# Patient Record
Sex: MALE | Race: WHITE
[De-identification: names, ages, dates, MRNs, and addresses within clinical notes are randomized per-mention and may not be internally consistent; named-entity substitution may affect disease eponyms.]

---

## 2018-12-08 ENCOUNTER — HOSPITAL ENCOUNTER (OUTPATIENT)
Dept: HOSPITAL 47 - LABWHC1 | Age: 63
End: 2018-12-08
Payer: COMMERCIAL

## 2018-12-08 DIAGNOSIS — E78.2: Primary | ICD-10-CM

## 2018-12-08 LAB
ALT SERPL-CCNC: 28 U/L (ref 10–49)
AST SERPL-CCNC: 24 U/L (ref 14–35)
CHOLEST SERPL-MCNC: 108 MG/DL (ref 0–200)
HDLC SERPL-MCNC: 41 MG/DL (ref 40–60)
LDLC SERPL CALC-MCNC: 50.6 MG/DL (ref 0–131)
TRIGL SERPL-MCNC: 82 MG/DL (ref 0–149)
VLDLC SERPL CALC-MCNC: 16.4 MG/DL (ref 5–40)

## 2018-12-08 PROCEDURE — 36415 COLL VENOUS BLD VENIPUNCTURE: CPT

## 2018-12-08 PROCEDURE — 80061 LIPID PANEL: CPT

## 2018-12-08 PROCEDURE — 84460 ALANINE AMINO (ALT) (SGPT): CPT

## 2018-12-08 PROCEDURE — 84450 TRANSFERASE (AST) (SGOT): CPT

## 2019-03-16 ENCOUNTER — HOSPITAL ENCOUNTER (EMERGENCY)
Dept: HOSPITAL 47 - EC | Age: 64
Discharge: HOME | End: 2019-03-16
Payer: COMMERCIAL

## 2019-03-16 VITALS
SYSTOLIC BLOOD PRESSURE: 146 MMHG | TEMPERATURE: 97.6 F | DIASTOLIC BLOOD PRESSURE: 65 MMHG | RESPIRATION RATE: 18 BRPM | HEART RATE: 82 BPM

## 2019-03-16 DIAGNOSIS — Z96.89: ICD-10-CM
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
## 2019-03-16 DIAGNOSIS — S63.502A: Primary | ICD-10-CM

## 2019-03-16 DIAGNOSIS — Z79.82: ICD-10-CM
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
## 2019-03-16 DIAGNOSIS — Z79.899: ICD-10-CM

## 2019-03-16 DIAGNOSIS — I10: ICD-10-CM

## 2019-03-16 DIAGNOSIS — I25.10: ICD-10-CM

## 2019-03-16 DIAGNOSIS — Z95.5: ICD-10-CM
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
## 2019-03-16 DIAGNOSIS — Z79.4: ICD-10-CM

## 2019-03-16 DIAGNOSIS — E11.9: ICD-10-CM

## 2019-03-16 DIAGNOSIS — Z79.02: ICD-10-CM

## 2019-03-16 DIAGNOSIS — I25.2: ICD-10-CM

## 2019-03-16 DIAGNOSIS — F17.200: ICD-10-CM

## 2019-03-16 DIAGNOSIS — E78.5: ICD-10-CM

## 2019-03-16 PROCEDURE — 99283 EMERGENCY DEPT VISIT LOW MDM: CPT

## 2019-03-16 NOTE — XR
EXAMINATION TYPE: XR wrist complete LT , 4 VIEWS

 

DATE OF EXAM ORDERED: 3/16/2019

 

HISTORY: Pain.

 

COMPARISON: None.

 

FINDINGS:  There are degenerative changes in the first carpometacarpal joint as well as the triscaphe
 joint. No fracture, dislocation or other acute osseous lesion is seen.

 

IMPRESSION: 

1. NO ACUTE OSSEOUS FRACTURE.

2. DEGENERATIVE CHANGE.

## 2019-03-16 NOTE — ED
Upper Extremity HPI





- General


Chief Complaint: Extremity Injury, Upper


Stated Complaint: Wrist injury


Time Seen by Provider: 03/16/19 11:02


Source: patient, RN notes reviewed


Mode of arrival: ambulatory


Limitations: no limitations





- History of Present Illness


Initial Comments: 





63-year-old male presents emergency Department chief complaint of left first 

injury.  Patient states that his symptoms while at work yesterday and fell down 

onto his left wrist.  Patient states is more swollen and painful today.  Patient

did not apply any heat or ice.  Patient denies any paresthesias.  Patient states

he does have known arthritic changes.





- Related Data


                                Home Medications











 Medication  Instructions  Recorded  Confirmed


 


Aspirin EC [Ecotrin] 81 mg PO DAILY 03/04/15 03/04/15


 


Clopidogrel [Plavix] 75 mg PO DAILY 03/04/15 03/04/15


 


Famotidine [Pepcid] 20 mg PO HS 03/04/15 03/04/15


 


Gabapentin [Neurontin] 300 mg PO HS 03/04/15 03/04/15


 


INSULIN ASPART (NovoLOG) [NovoLOG] 0 unit SQ ACHS 03/04/15 03/04/15


 


Insulin Glargine,Hum.rec.anlog 88 unit SQ DAILY 03/04/15 03/04/15





[Lantus Solostar]   


 


Lisinopril [Prinivil] 20 mg PO DAILY 03/04/15 03/04/15


 


Metoprolol Tartrate [Lopressor] 50 mg PO BID 03/04/15 03/04/15


 


Saxagliptin HCl/Metformin HCl 1 each PO DAILY 03/04/15 03/04/15





[Kombiglyze Xr 5-1,000 mg Tab]   


 


Simvastatin [Zocor] 40 mg PO HS 03/04/15 03/04/15








                                  Previous Rx's











 Medication  Instructions  Recorded


 


Cyclobenzaprine [Flexeril] 10 mg PO TID PRN #15 tab 03/04/15


 


HYDROcodone/APAP 10-325MG [Norco 1 each PO Q6H PRN #20 tab 03/04/15





10]  











                                    Allergies











Allergy/AdvReac Type Severity Reaction Status Date / Time


 


No Known Allergies Allergy   Verified 03/04/15 11:43














Review of Systems


ROS Statement: 


Those systems with pertinent positive or pertinent negative responses have been 

documented in the HPI.





ROS Other: All systems not noted in ROS Statement are negative.





Past Medical History


Past Medical History: Coronary Artery Disease (CAD), Diabetes Mellitus, 

Hyperlipidemia, Hypertension, Myocardial Infarction (MI)


History of Any Multi-Drug Resistant Organisms: None Reported


Past Surgical History: Heart Catheterization With Stent, Joint Replacement, 

Orthopedic Surgery, Tonsillectomy


Past Psychological History: No Psychological Hx Reported


Smoking Status: Current every day smoker


Past Alcohol Use History: Rare


Past Drug Use History: None Reported





General Exam


Limitations: no limitations


General appearance: alert, in no apparent distress


Head exam: Present: atraumatic, normocephalic, normal inspection


Respiratory exam: Present: normal lung sounds bilaterally.  Absent: respiratory 

distress, wheezes, rales, rhonchi, stridor


Cardiovascular Exam: Present: regular rate, normal rhythm, normal heart sounds. 

Absent: systolic murmur, diastolic murmur, rubs, gallop, clicks


Extremities exam: Present: other (Left wrist there is moderate swelling, mild 

tenderness mild tenderness in the proximal hand.  There is no distal phalanx 

tenderness, patient's full range of motion.  Neurovascular intact)


Skin exam: Present: warm, dry, intact, normal color.  Absent: rash





Course





                                   Vital Signs











  03/16/19





  10:58


 


Temperature 97.6 F


 


Pulse Rate 82


 


Respiratory 18





Rate 


 


Blood Pressure 146/65


 


O2 Sat by Pulse 96





Oximetry 














Medical Decision Making





- Medical Decision Making





63-year-old male presented for left wrist injury.  X-rays were obtained as read 

by radiologist no acute fracture.  Patient is a left wrist sprain.  Patient will

be started on anti-inflammatories, ice and return parameters were discussed.





- Radiology Data


Radiology results: report reviewed, image reviewed





X-ray no acute fracture.





Disposition


Clinical Impression: 


 Left wrist sprain





Disposition: HOME SELF-CARE


Condition: Stable


Instructions (If sedation given, give patient instructions):  Wrist Injury (ED)


Additional Instructions: 


Please return to the Emergency Department if symptoms worsen or any other 

concerns.  Follow-up with orthopedics if no improvement.


Is patient prescribed a controlled substance at d/c from ED?: No


Referrals: 


Umesh Singh MD [Primary Care Provider] - 1-2 days


Ricky Yo DO [Doctor of Osteopathic Medicine] - 1-2 days


Time of Disposition: 11:49

## 2019-12-31 ENCOUNTER — HOSPITAL ENCOUNTER (OUTPATIENT)
Dept: HOSPITAL 47 - LABWHC1 | Age: 64
Discharge: HOME | End: 2019-12-31
Attending: INTERNAL MEDICINE
Payer: COMMERCIAL

## 2019-12-31 DIAGNOSIS — E78.2: Primary | ICD-10-CM

## 2019-12-31 LAB
ALT SERPL-CCNC: 18 U/L (ref 10–49)
AST SERPL-CCNC: 16 U/L (ref 14–35)
CHOLEST SERPL-MCNC: 178 MG/DL (ref 0–200)
HDLC SERPL-MCNC: 38 MG/DL (ref 40–60)
LDLC SERPL CALC-MCNC: 111 MG/DL (ref 0–131)
TRIGL SERPL-MCNC: 145 MG/DL (ref 0–149)
VLDLC SERPL CALC-MCNC: 29 MG/DL (ref 5–40)

## 2019-12-31 PROCEDURE — 84460 ALANINE AMINO (ALT) (SGPT): CPT

## 2019-12-31 PROCEDURE — 84450 TRANSFERASE (AST) (SGOT): CPT

## 2019-12-31 PROCEDURE — 80061 LIPID PANEL: CPT

## 2019-12-31 PROCEDURE — 36415 COLL VENOUS BLD VENIPUNCTURE: CPT

## 2020-12-29 ENCOUNTER — HOSPITAL ENCOUNTER (OUTPATIENT)
Dept: HOSPITAL 47 - LABWHC1 | Age: 65
Discharge: HOME | End: 2020-12-29
Attending: INTERNAL MEDICINE
Payer: MEDICARE

## 2020-12-29 DIAGNOSIS — E78.2: Primary | ICD-10-CM

## 2020-12-29 LAB
ALT SERPL-CCNC: 18 U/L (ref 10–49)
AST SERPL-CCNC: 14 U/L (ref 14–35)
CHOLEST SERPL-MCNC: 181 MG/DL (ref 0–200)
HDLC SERPL-MCNC: 35 MG/DL (ref 40–60)
LDLC SERPL CALC-MCNC: 114.8 MG/DL (ref 0–131)
TRIGL SERPL-MCNC: 156 MG/DL (ref 0–149)
VLDLC SERPL CALC-MCNC: 31.2 MG/DL (ref 5–40)

## 2020-12-29 PROCEDURE — 84460 ALANINE AMINO (ALT) (SGPT): CPT

## 2020-12-29 PROCEDURE — 84450 TRANSFERASE (AST) (SGOT): CPT

## 2020-12-29 PROCEDURE — 36415 COLL VENOUS BLD VENIPUNCTURE: CPT

## 2020-12-29 PROCEDURE — 80061 LIPID PANEL: CPT

## 2021-10-04 ENCOUNTER — HOSPITAL ENCOUNTER (EMERGENCY)
Dept: HOSPITAL 47 - EC | Age: 66
Discharge: LEFT BEFORE DISCHARGE | End: 2021-10-04
Payer: MEDICARE

## 2021-10-04 VITALS — RESPIRATION RATE: 20 BRPM

## 2021-10-04 VITALS — HEART RATE: 70 BPM | TEMPERATURE: 98.2 F | SYSTOLIC BLOOD PRESSURE: 122 MMHG | DIASTOLIC BLOOD PRESSURE: 79 MMHG

## 2021-10-04 DIAGNOSIS — I25.2: ICD-10-CM

## 2021-10-04 DIAGNOSIS — E11.9: ICD-10-CM

## 2021-10-04 DIAGNOSIS — I10: ICD-10-CM

## 2021-10-04 DIAGNOSIS — L03.221: Primary | ICD-10-CM

## 2021-10-04 DIAGNOSIS — Z79.82: ICD-10-CM

## 2021-10-04 DIAGNOSIS — Z79.4: ICD-10-CM

## 2021-10-04 DIAGNOSIS — I25.10: ICD-10-CM

## 2021-10-04 DIAGNOSIS — Z86.16: ICD-10-CM

## 2021-10-04 DIAGNOSIS — Z95.5: ICD-10-CM

## 2021-10-04 DIAGNOSIS — F17.200: ICD-10-CM

## 2021-10-04 DIAGNOSIS — Z79.899: ICD-10-CM

## 2021-10-04 DIAGNOSIS — E78.5: ICD-10-CM

## 2021-10-04 LAB
ALBUMIN SERPL-MCNC: 3.9 G/DL (ref 3.5–5)
ALP SERPL-CCNC: 97 U/L (ref 38–126)
ALT SERPL-CCNC: 15 U/L (ref 4–49)
ANION GAP SERPL CALC-SCNC: 9 MMOL/L
AST SERPL-CCNC: 29 U/L (ref 17–59)
BASOPHILS # BLD AUTO: 0.1 K/UL (ref 0–0.2)
BASOPHILS NFR BLD AUTO: 1 %
BUN SERPL-SCNC: 21 MG/DL (ref 9–20)
CALCIUM SPEC-MCNC: 9.4 MG/DL (ref 8.4–10.2)
CHLORIDE SERPL-SCNC: 103 MMOL/L (ref 98–107)
CO2 SERPL-SCNC: 22 MMOL/L (ref 22–30)
EOSINOPHIL # BLD AUTO: 0.2 K/UL (ref 0–0.7)
EOSINOPHIL NFR BLD AUTO: 2 %
ERYTHROCYTE [DISTWIDTH] IN BLOOD BY AUTOMATED COUNT: 4.69 M/UL (ref 4.3–5.9)
ERYTHROCYTE [DISTWIDTH] IN BLOOD: 14.5 % (ref 11.5–15.5)
GLUCOSE SERPL-MCNC: 235 MG/DL (ref 74–99)
HCT VFR BLD AUTO: 42.7 % (ref 39–53)
HGB BLD-MCNC: 14.5 GM/DL (ref 13–17.5)
LYMPHOCYTES # SPEC AUTO: 1.4 K/UL (ref 1–4.8)
LYMPHOCYTES NFR SPEC AUTO: 15 %
MCH RBC QN AUTO: 31 PG (ref 25–35)
MCHC RBC AUTO-ENTMCNC: 34 G/DL (ref 31–37)
MCV RBC AUTO: 91.1 FL (ref 80–100)
MONOCYTES # BLD AUTO: 0.5 K/UL (ref 0–1)
MONOCYTES NFR BLD AUTO: 5 %
NEUTROPHILS # BLD AUTO: 7.4 K/UL (ref 1.3–7.7)
NEUTROPHILS NFR BLD AUTO: 75 %
PLATELET # BLD AUTO: 326 K/UL (ref 150–450)
POTASSIUM SERPL-SCNC: 5.2 MMOL/L (ref 3.5–5.1)
PROT SERPL-MCNC: 7.2 G/DL (ref 6.3–8.2)
SODIUM SERPL-SCNC: 134 MMOL/L (ref 137–145)
WBC # BLD AUTO: 9.8 K/UL (ref 3.8–10.6)

## 2021-10-04 PROCEDURE — 96361 HYDRATE IV INFUSION ADD-ON: CPT

## 2021-10-04 PROCEDURE — 80053 COMPREHEN METABOLIC PANEL: CPT

## 2021-10-04 PROCEDURE — 83605 ASSAY OF LACTIC ACID: CPT

## 2021-10-04 PROCEDURE — 85025 COMPLETE CBC W/AUTO DIFF WBC: CPT

## 2021-10-04 PROCEDURE — 36415 COLL VENOUS BLD VENIPUNCTURE: CPT

## 2021-10-04 PROCEDURE — 87040 BLOOD CULTURE FOR BACTERIA: CPT

## 2021-10-04 PROCEDURE — 96365 THER/PROPH/DIAG IV INF INIT: CPT

## 2021-10-04 PROCEDURE — 70491 CT SOFT TISSUE NECK W/DYE: CPT

## 2021-10-04 PROCEDURE — 99283 EMERGENCY DEPT VISIT LOW MDM: CPT

## 2021-10-04 RX ADMIN — NICARDIPINE HYDROCHLORIDE SCH MLS/HR: 2.5 INJECTION INTRAVENOUS at 10:53

## 2021-10-04 RX ADMIN — NICARDIPINE HYDROCHLORIDE SCH MLS/HR: 2.5 INJECTION INTRAVENOUS at 10:50

## 2021-10-04 NOTE — ED
General Adult HPI





- General


Chief complaint: Skin/Abscess/Foreign Body


Stated complaint: Lump/Infection


Time Seen by Provider: 10/04/21 09:53


Source: patient, RN notes reviewed


Mode of arrival: ambulatory


Limitations: no limitations





- History of Present Illness


Initial comments: 





66-year-old male with a past medical history of CAD, diabetes mellitus, hyp

erlipidemia, hypertension presents to the emergency room for a chief complaint 

of infection on the back of the neck.  Patient has had ear infection for the 

past several days on the right side.  Patient states he's had similar infections

in the past worse on the left side.  States using a MRSA.  Patient states that 

when this started on the right causes primary care doctor who told him to come 

to the ER for IV antibiotics.Patient has no other complaints at this time 

including shortness of breath, chest pain, abdominal pain, nausea or vomiting, 

headache, or visual changes.





- Related Data


                                Home Medications











 Medication  Instructions  Recorded  Confirmed


 


Aspirin EC [Ecotrin] 81 mg PO DAILY 03/04/15 10/04/21


 


Metoprolol Tartrate [Lopressor] 50 mg PO BID 03/04/15 10/04/21


 


Simvastatin [Zocor] 40 mg PO MOWEFR 03/04/15 10/04/21


 


lisinopriL [Prinivil] 20 mg PO DAILY 03/04/15 10/04/21


 


Canagliflozin [Invokana] 300 mg PO DAILY 10/04/21 10/04/21


 


Famotidine [Pepcid] 40 mg PO HS 10/04/21 10/04/21


 


Gabapentin [Neurontin] 100 mg PO TID 10/04/21 10/04/21


 


Insulin Glargine,Hum.rec.anlog 75 unit SQ HS 10/04/21 10/04/21





[Basaglar Kwikpen U-100]   


 


Insuln Asp Prt/Insulin Aspart See Protocol SQ AC-BID 10/04/21 10/04/21





[NovoLOG MIX 70-30 VIAL]   


 


Pioglitazone [Actos] 30 mg PO DAILY 10/04/21 10/04/21








                                  Previous Rx's











 Medication  Instructions  Recorded


 


Clindamycin [Cleocin] 450 mg PO Q8H 30 Days #63 cap 10/04/21











                                    Allergies











Allergy/AdvReac Type Severity Reaction Status Date / Time


 


No Known Allergies Allergy   Verified 10/04/21 11:03














Review of Systems


ROS Statement: 


Those systems with pertinent positive or pertinent negative responses have been 

documented in the HPI.





ROS Other: All systems not noted in ROS Statement are negative.





Past Medical History


Past Medical History: Coronary Artery Disease (CAD), Diabetes Mellitus, 

Hyperlipidemia, Hypertension, Myocardial Infarction (MI)


Additional Past Medical History / Comment(s): covid


History of Any Multi-Drug Resistant Organisms: MRSA


Date of last positivie culture/infection: 9/21


MDRO Source:: neck


Past Surgical History: Heart Catheterization With Stent, Joint Replacement, 

Orthopedic Surgery, Tonsillectomy


Past Psychological History: No Psychological Hx Reported


Smoking Status: Current every day smoker


Past Alcohol Use History: Rare


Past Drug Use History: Marijuana





General Exam


Limitations: no limitations


General appearance: alert, in no apparent distress


Head exam: Present: atraumatic


Eye exam: Present: normal appearance, PERRL, EOMI.  Absent: scleral icterus, 

conjunctival injection


ENT exam: Present: normal exam, mucous membranes moist


Neck exam: Present: tenderness (Patient has erythema and edema noted to the 

right side of the posterior neck), full ROM


Respiratory exam: Present: normal lung sounds bilaterally.  Absent: respiratory 

distress, wheezes


Cardiovascular Exam: Present: regular rate, normal rhythm, normal heart sounds


GI/Abdominal exam: Present: soft.  Absent: distended, tenderness, normal bowel 

sounds





Course





                                   Vital Signs











  10/04/21 10/04/21





  09:36 11:34


 


Temperature 98.5 F 


 


Pulse Rate 74 65


 


Respiratory 18 20





Rate  


 


Blood Pressure 132/69 126/78


 


O2 Sat by Pulse 96 97





Oximetry  














Medical Decision Making





- Medical Decision Making





Idols are stable.  HPI and physical exam as documented.  CBC and CMP are unre

markable.  CT soft tissue neck with contrast shows findings consistent with a 

posterior focal soft tissue infection and/or cellulitis without well formed 

thick walled drainable abscess noted.  I did try to admit patient which he then 

refused.  He is aware that given the location of infection there is concern it 

could spread to other areas such as spinal canal.  Patient states he cannot stay

 in the hospital as he is too much to do.  Admission will be canceled.  Patient 

will be started on clindamycin to cover for MRSA.  He will return for any 

worsening symptoms.  Patient aware he is leaving AGAINST MEDICAL ADVICE





- Lab Data


Result diagrams: 


                                 10/04/21 10:44





                                 10/04/21 10:44





                                   Lab Results











  10/04/21 10/04/21 10/04/21 Range/Units





  10:44 10:44 10:44 


 


WBC  9.8    (3.8-10.6)  k/uL


 


RBC  4.69    (4.30-5.90)  m/uL


 


Hgb  14.5    (13.0-17.5)  gm/dL


 


Hct  42.7    (39.0-53.0)  %


 


MCV  91.1    (80.0-100.0)  fL


 


MCH  31.0    (25.0-35.0)  pg


 


MCHC  34.0    (31.0-37.0)  g/dL


 


RDW  14.5    (11.5-15.5)  %


 


Plt Count  326    (150-450)  k/uL


 


MPV  7.0    


 


Neutrophils %  75    %


 


Lymphocytes %  15    %


 


Monocytes %  5    %


 


Eosinophils %  2    %


 


Basophils %  1    %


 


Neutrophils #  7.4    (1.3-7.7)  k/uL


 


Lymphocytes #  1.4    (1.0-4.8)  k/uL


 


Monocytes #  0.5    (0-1.0)  k/uL


 


Eosinophils #  0.2    (0-0.7)  k/uL


 


Basophils #  0.1    (0-0.2)  k/uL


 


Sodium   134 L   (137-145)  mmol/L


 


Potassium   5.2 H   (3.5-5.1)  mmol/L


 


Chloride   103   ()  mmol/L


 


Carbon Dioxide   22   (22-30)  mmol/L


 


Anion Gap   9   mmol/L


 


BUN   21 H   (9-20)  mg/dL


 


Creatinine   0.70   (0.66-1.25)  mg/dL


 


Est GFR (CKD-EPI)AfAm   >90   (>60 ml/min/1.73 sqM)  


 


Est GFR (CKD-EPI)NonAf   >90   (>60 ml/min/1.73 sqM)  


 


Glucose   235 H   (74-99)  mg/dL


 


Plasma Lactic Acid Nhan    0.8  (0.7-2.0)  mmol/L


 


Calcium   9.4   (8.4-10.2)  mg/dL


 


Total Bilirubin   0.7   (0.2-1.3)  mg/dL


 


AST   29   (17-59)  U/L


 


ALT   15   (4-49)  U/L


 


Alkaline Phosphatase   97   ()  U/L


 


Total Protein   7.2   (6.3-8.2)  g/dL


 


Albumin   3.9   (3.5-5.0)  g/dL














Disposition


Clinical Impression: 


 Cellulitis of neck





Disposition: Left Against Medical Advice


Condition: Undetermined


Instructions (If sedation given, give patient instructions):  Cellulitis (ED), 

Abscess (ED)


Prescriptions: 


Clindamycin [Cleocin] 450 mg PO Q8H 30 Days #63 cap


Is patient prescribed a controlled substance at d/c from ED?: No


Referrals: 


Umesh Singh MD [Primary Care Provider] - 1-2 days


Time of Disposition: 12:59

## 2021-10-04 NOTE — CT
EXAMINATION TYPE: CT soft tissue neck w con

 

DATE OF EXAM: 10/4/2021

 

HISTORY: right side posterior base of skull, redness, swelling, abscesses

 

COMPARISON: NONE

 

CT DLP: 485.6 mGycm.  Automated Exposure Control for Dose Reduction was Utilized.

 

TECHNIQUE:  CT scan of the neck is performed with IV Contrast, patient injected with 100 mL of Isovue
 300, axial images are obtained, coronal and sagittal reformatted images are reviewed.

 

FINDINGS:

 

Airway: No gross abnormality seen.

 

Parotid/submandibular glands:  No gross abnormality seen.

 

Carotid/Vascular Structures: Moderate right lateral peripheral calcified plaque right carotid bulb wi
thout significant stenosis . Hypoplastic or occluded distal left vertebral artery. Dominant right mika
tebral artery fills the basilar artery. Moderate calcified plaque distal internal carotid arteries bi
laterally. Hypoplastic right P1 segment with filling of the right P2 segment due to patent anterior c
ommunicating artery. Normal variant.

 

Osseous Structures: Mild to moderate disc space narrowing and spurring C5-C6 and C6-C7 levels. 

 

Other: In the posterior subcutaneous tissue there is moderate ill-defined fluid and fat stranding. Th
ere is abnormal skin thickening. There is more focal fluid near a horizontal density presumed externa
l and related to a prominent skin crease. This measures approximately 5 cm transversely by 1.5 cm AP 
diameter by 1.5 cm clinical correlation diameter. There is no thick wall drainable abscess. There is 
no involvement of the deeper posterior cervical muscles.

 

Moderate mucosal thickening involving ethmoid sinuses bilaterally.

 

Some scattered prominent but subcentimeter lymph nodes throughout the neck bilaterally. No abnormal g
reater than 1 cm neck lymph nodes.

 

IMPRESSION: Findings consistent with a posterior focal soft tissue infection and/or cellulitis. No we
ll-formed thick walled drainable abscess noted.

## 2021-12-23 ENCOUNTER — HOSPITAL ENCOUNTER (OUTPATIENT)
Dept: HOSPITAL 47 - LABWHC1 | Age: 66
Discharge: HOME | End: 2021-12-23
Attending: INTERNAL MEDICINE
Payer: MEDICARE

## 2021-12-23 DIAGNOSIS — E78.2: Primary | ICD-10-CM

## 2021-12-23 PROCEDURE — 84460 ALANINE AMINO (ALT) (SGPT): CPT

## 2021-12-23 PROCEDURE — 80061 LIPID PANEL: CPT

## 2021-12-23 PROCEDURE — 36415 COLL VENOUS BLD VENIPUNCTURE: CPT

## 2021-12-23 PROCEDURE — 84450 TRANSFERASE (AST) (SGOT): CPT

## 2021-12-24 LAB
ALT SERPL-CCNC: 17 U/L (ref 10–49)
AST SERPL-CCNC: 14 U/L (ref 14–35)
CHOLEST SERPL-MCNC: 111 MG/DL (ref 0–200)
HDLC SERPL-MCNC: 37.4 MG/DL (ref 40–60)
LDLC SERPL CALC-MCNC: 56.7 MG/DL (ref 0–131)
TRIGL SERPL-MCNC: 84.6 MG/DL (ref 0–149)
VLDLC SERPL CALC-MCNC: 16.92 MG/DL (ref 5–40)

## 2022-06-28 ENCOUNTER — HOSPITAL ENCOUNTER (INPATIENT)
Dept: HOSPITAL 47 - EC | Age: 67
LOS: 2 days | Discharge: HOME | DRG: 89 | End: 2022-06-30
Attending: ORTHOPAEDIC SURGERY | Admitting: ORTHOPAEDIC SURGERY
Payer: MEDICARE

## 2022-06-28 DIAGNOSIS — E78.5: ICD-10-CM

## 2022-06-28 DIAGNOSIS — M16.12: ICD-10-CM

## 2022-06-28 DIAGNOSIS — I07.1: ICD-10-CM

## 2022-06-28 DIAGNOSIS — S43.005A: ICD-10-CM

## 2022-06-28 DIAGNOSIS — Z95.5: ICD-10-CM

## 2022-06-28 DIAGNOSIS — M48.02: ICD-10-CM

## 2022-06-28 DIAGNOSIS — Z82.49: ICD-10-CM

## 2022-06-28 DIAGNOSIS — Z79.4: ICD-10-CM
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
## 2022-06-28 DIAGNOSIS — Z79.84: ICD-10-CM

## 2022-06-28 DIAGNOSIS — S22.43XA: ICD-10-CM

## 2022-06-28 DIAGNOSIS — S43.102A: ICD-10-CM

## 2022-06-28 DIAGNOSIS — I25.10: ICD-10-CM

## 2022-06-28 DIAGNOSIS — Z79.899: ICD-10-CM

## 2022-06-28 DIAGNOSIS — S00.03XA: ICD-10-CM
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
## 2022-06-28 DIAGNOSIS — I10: ICD-10-CM
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
## 2022-06-28 DIAGNOSIS — F17.210: ICD-10-CM

## 2022-06-28 DIAGNOSIS — M50.21: ICD-10-CM

## 2022-06-28 DIAGNOSIS — J32.2: ICD-10-CM

## 2022-06-28 DIAGNOSIS — N32.0: ICD-10-CM

## 2022-06-28 DIAGNOSIS — S06.0X9A: Primary | ICD-10-CM

## 2022-06-28 DIAGNOSIS — G31.89: ICD-10-CM

## 2022-06-28 DIAGNOSIS — M60.9: ICD-10-CM

## 2022-06-28 DIAGNOSIS — E11.9: ICD-10-CM

## 2022-06-28 DIAGNOSIS — Z79.82: ICD-10-CM

## 2022-06-28 DIAGNOSIS — S46.912A: ICD-10-CM

## 2022-06-28 DIAGNOSIS — K57.30: ICD-10-CM

## 2022-06-28 DIAGNOSIS — R55: ICD-10-CM

## 2022-06-28 DIAGNOSIS — I25.2: ICD-10-CM
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
## 2022-06-28 DIAGNOSIS — K21.9: ICD-10-CM

## 2022-06-28 LAB
ANION GAP SERPL CALC-SCNC: 9 MMOL/L
BASOPHILS # BLD AUTO: 0 K/UL (ref 0–0.2)
BASOPHILS NFR BLD AUTO: 1 %
BUN SERPL-SCNC: 22 MG/DL (ref 9–20)
CALCIUM SPEC-MCNC: 8.9 MG/DL (ref 8.4–10.2)
CHLORIDE SERPL-SCNC: 105 MMOL/L (ref 98–107)
CO2 SERPL-SCNC: 21 MMOL/L (ref 22–30)
EOSINOPHIL # BLD AUTO: 0.1 K/UL (ref 0–0.7)
EOSINOPHIL NFR BLD AUTO: 1 %
ERYTHROCYTE [DISTWIDTH] IN BLOOD BY AUTOMATED COUNT: 4.35 M/UL (ref 4.3–5.9)
ERYTHROCYTE [DISTWIDTH] IN BLOOD: 13.8 % (ref 11.5–15.5)
GLUCOSE SERPL-MCNC: 166 MG/DL (ref 74–99)
HCT VFR BLD AUTO: 41.5 % (ref 39–53)
HGB BLD-MCNC: 13.7 GM/DL (ref 13–17.5)
LYMPHOCYTES # SPEC AUTO: 0.9 K/UL (ref 1–4.8)
LYMPHOCYTES NFR SPEC AUTO: 10 %
MAGNESIUM SPEC-SCNC: 1.9 MG/DL (ref 1.6–2.3)
MCH RBC QN AUTO: 31.5 PG (ref 25–35)
MCHC RBC AUTO-ENTMCNC: 33 G/DL (ref 31–37)
MCV RBC AUTO: 95.4 FL (ref 80–100)
MONOCYTES # BLD AUTO: 0.5 K/UL (ref 0–1)
MONOCYTES NFR BLD AUTO: 5 %
NEUTROPHILS # BLD AUTO: 6.9 K/UL (ref 1.3–7.7)
NEUTROPHILS NFR BLD AUTO: 82 %
PLATELET # BLD AUTO: 282 K/UL (ref 150–450)
POTASSIUM SERPL-SCNC: 4 MMOL/L (ref 3.5–5.1)
SODIUM SERPL-SCNC: 135 MMOL/L (ref 137–145)
WBC # BLD AUTO: 8.4 K/UL (ref 3.8–10.6)

## 2022-06-28 PROCEDURE — 83735 ASSAY OF MAGNESIUM: CPT

## 2022-06-28 PROCEDURE — 80048 BASIC METABOLIC PNL TOTAL CA: CPT

## 2022-06-28 PROCEDURE — 99285 EMERGENCY DEPT VISIT HI MDM: CPT

## 2022-06-28 PROCEDURE — 84484 ASSAY OF TROPONIN QUANT: CPT

## 2022-06-28 PROCEDURE — 96376 TX/PRO/DX INJ SAME DRUG ADON: CPT

## 2022-06-28 PROCEDURE — 93005 ELECTROCARDIOGRAM TRACING: CPT

## 2022-06-28 PROCEDURE — 72195 MRI PELVIS W/O DYE: CPT

## 2022-06-28 PROCEDURE — 72192 CT PELVIS W/O DYE: CPT

## 2022-06-28 PROCEDURE — 85025 COMPLETE CBC W/AUTO DIFF WBC: CPT

## 2022-06-28 PROCEDURE — 70450 CT HEAD/BRAIN W/O DYE: CPT

## 2022-06-28 PROCEDURE — 96374 THER/PROPH/DIAG INJ IV PUSH: CPT

## 2022-06-28 PROCEDURE — 93306 TTE W/DOPPLER COMPLETE: CPT

## 2022-06-28 PROCEDURE — 93880 EXTRACRANIAL BILAT STUDY: CPT

## 2022-06-28 PROCEDURE — 72125 CT NECK SPINE W/O DYE: CPT

## 2022-06-28 PROCEDURE — 73502 X-RAY EXAM HIP UNI 2-3 VIEWS: CPT

## 2022-06-28 RX ADMIN — METOPROLOL TARTRATE SCH MG: 50 TABLET, FILM COATED ORAL at 22:46

## 2022-06-28 RX ADMIN — INSULIN DETEMIR SCH UNIT: 100 INJECTION, SOLUTION SUBCUTANEOUS at 22:46

## 2022-06-28 RX ADMIN — HEPARIN SODIUM SCH UNIT: 5000 INJECTION INTRAVENOUS; SUBCUTANEOUS at 22:46

## 2022-06-28 RX ADMIN — FAMOTIDINE SCH MG: 20 TABLET, FILM COATED ORAL at 22:47

## 2022-06-28 RX ADMIN — LIDOCAINE SCH PATCH: 50 PATCH TOPICAL at 19:52

## 2022-06-28 RX ADMIN — HYDROMORPHONE HYDROCHLORIDE PRN MG: 1 INJECTION, SOLUTION INTRAMUSCULAR; INTRAVENOUS; SUBCUTANEOUS at 22:47

## 2022-06-28 NOTE — CT
EXAMINATION TYPE: CT hip LT wo con

 

DATE OF EXAM: 6/28/2022

 

COMPARISON: None

 

HISTORY: left hip pain following fall

 

CT DLP: 560.1 mGycm

Automated exposure control for dose reduction was used.

 

Images obtained from the mid ileum to the mid shaft of the femur with no contrast.

 

The proximal femur is intact. No fracture line seen. There is slight narrowing of the hip joint space
. There is mild acetabular spurring. There is minimal spurring on the femoral head. Acetabulum appear
s intact. No evidence of a soft tissue mass.

 

IMPRESSION:

Mild osteoarthritis. No evidence of hip fracture.

## 2022-06-28 NOTE — XR
EXAMINATION TYPE: XR ribs RT w pa chest x-ray

 

DATE OF EXAM: 6/28/2022

 

COMPARISON: X-ray dated 3/4/2015

 

INDICATION: Pain after fall

 

TECHNIQUE: 6 views of the chest and right ribs.

 

FINDINGS: 

Osteopenia. Questionable COPD changes. Suspected minimal infiltration of the right lower lung zone ve
rsus artifact. Unremarkable lungs otherwise. No pleural effusion or pneumothorax. No cardiomegaly. Ao
rtic atherosclerotic calcifications.

 

Mildly displaced fractures of the anterior axillary portion of the right sixth and seventh ribs. Susp
ected chronic fractures of the right ninth and 10th ribs. Degenerative changes of the thoracic spine.


 

IMPRESSION: 

Right sixth and seventh ribs acute fractures and other findings as described above.

## 2022-06-28 NOTE — CT
EXAMINATION TYPE: CT pelvis wo con

 

DATE OF EXAM: 6/28/2022

 

COMPARISON: None

 

HISTORY: unable to bear weight following fall

 

CT DLP: 440.2 mGycm

Automated exposure control for dose reduction was used.

 

Images obtained from the iliac crests to the subtrochanteric femurs with no contrast.

 

Sacrum and coccyx show normal alignment. No fracture seen. Sacroiliac joints are intact. The pelvic r
ing appears intact. Acetabula appear intact. There is right hip prosthesis that appears in good posit
ion. Proximal left femur is intact. No sign of loosening of the prosthesis. The proximal left femur a
nd hip joint appear intact. There is mild acetabular spurring.

 

Bladder distends smoothly. No free fluid in the pelvis. Urinary bladder is large and measures 16 cm. 
No evidence of pelvic lymphadenopathy. There is sigmoid diverticulosis.

 

IMPRESSION:

No acute bony abnormality. Exam fails to demonstrate a left hip fracture.

 

Sigmoid diverticulosis without diverticulitis. Dilated urinary bladder suggestive of bladder outlet o
bstruction.

## 2022-06-28 NOTE — ED
Fall HPI





<Power Infante - Last Filed: 06/28/22 20:34>





- General


Source: EMS


Mode of arrival: EMS





<Jenna Arreaga - Last Filed: 06/29/22 15:12>





- General


Chief Complaint: Fall


Stated Complaint: Fall, Head Injury


Time Seen by Provider: 06/28/22 13:15





- History of Present Illness


Initial Comments: 





Patient is a 67-year-old male with past medical history of coronary artery 

disease, diabetes, hypertension who presents to the emergency department after 

he had a trip and fall.  He was at work where he sustained a fall.  He fell 

backwards and hit his left hip.  He then attempted to get up and was having 

significant pain.  He stood for a few minutes before he ended up having a 

syncopal episode.  The second time he fell, he fell onto his right chest wall 

and hit his head.  He did lose consciousness.  Patient is on a blood thinners.  

Presents complaining of left shoulder, right rib and left hip pain.  He was 

unable to ambulate after the injury.  He did receive 50 g of fentanyl via EMS 

without any improvement.  He denies any shortness of breath.  No other 

alleviating, precipitating or modifying factors (Jenna Arreaga)





- Related Data


                                Home Medications











 Medication  Instructions  Recorded  Confirmed


 


Aspirin EC [Ecotrin] 81 mg PO DAILY 03/04/15 06/28/22


 


Metoprolol Tartrate [Lopressor] 50 mg PO BID 03/04/15 06/28/22


 


Simvastatin [Zocor] 40 mg PO MOWEFR 03/04/15 06/28/22


 


lisinopriL [Prinivil] 20 mg PO DAILY 03/04/15 06/28/22


 


Canagliflozin [Invokana] 300 mg PO DAILY 10/04/21 06/28/22


 


Famotidine [Pepcid] 20 mg PO BID 10/04/21 06/28/22


 


Insulin Glargine,Hum.rec.anlog 40 unit SQ HS 10/04/21 06/28/22





[Basaglar Kwikpen U-100]   


 


Insuln Asp Prt/Insulin Aspart 5 unit SQ AC-BID 10/04/21 06/28/22





[NovoLOG MIX 70-30 VIAL]   


 


Pioglitazone [Actos] 30 mg PO DAILY 10/04/21 06/28/22


 


Semaglutide [Ozempic] 1 mg SQ SA 06/28/22 06/28/22


 


metFORMIN HCL ER [Glucophage XR] 1,000 mg PO BID 06/28/22 06/28/22











                                    Allergies











Allergy/AdvReac Type Severity Reaction Status Date / Time


 


No Known Allergies Allergy   Verified 10/04/21 11:03














Review of Systems


ROS Other: All systems not noted in ROS Statement are negative.





<Power Infante - Last Filed: 06/28/22 20:34>


ROS Other: All systems not noted in ROS Statement are negative.





<WhitleyJenna CLEVE - Last Filed: 06/29/22 15:12>


ROS Statement: 


Those systems with pertinent positive or pertinent negative responses have been 

documented in the HPI.








Past Medical History


Past Medical History: Coronary Artery Disease (CAD), Diabetes Mellitus, 

Hyperlipidemia, Hypertension, Myocardial Infarction (MI)


Additional Past Medical History / Comment(s): covid


History of Any Multi-Drug Resistant Organisms: MRSA


Date of last positivie culture/infection: 9/21


MDRO Source:: neck


Past Surgical History: Heart Catheterization With Stent, Joint Replacement, 

Orthopedic Surgery, Tonsillectomy


Past Psychological History: No Psychological Hx Reported


Smoking Status: Current every day smoker


Past Alcohol Use History: Rare


Past Drug Use History: Marijuana





<Jenna Arreaga - Last Filed: 06/29/22 15:12>





General Exam


Limitations: no limitations


General appearance: alert, in no apparent distress


Head exam: Present: normocephalic, other (abrasion, occiput. no bleeding. small 

hematoma)


Eye exam: Present: normal appearance, PERRL, EOMI.  Absent: scleral icterus, 

conjunctival injection, periorbital swelling


ENT exam: Present: normal exam, mucous membranes moist


Neck exam: Present: normal inspection.  Absent: tenderness, meningismus, 

lymphadenopathy


Respiratory exam: Present: normal lung sounds bilaterally, chest wall tenderness

(right lateral mid chest wall. no overlying brusing).  Absent: respiratory 

distress, wheezes, rales, rhonchi, stridor


Cardiovascular Exam: Present: regular rate, normal rhythm, normal heart sounds. 

Absent: systolic murmur, diastolic murmur, rubs, gallop, clicks


GI/Abdominal exam: Present: soft, normal bowel sounds.  Absent: distended, 

tenderness, guarding, rebound, rigid


Extremities exam: Present: tenderness (left ischial tuberosity, left anterior 

hip joint tenderness. decreased flexion due to pain. no knee or ankle pain. left

ac joint and left anterior shoulder joint pain. no obvious deformity. 2+ pulses 

in all 4 extremities. Equal  strength), normal capillary refill.  Absent: pe

adam edema, joint swelling, calf tenderness


Back exam: Present: normal inspection


Neurological exam: Present: alert, oriented X3, CN II-XII intact


Psychiatric exam: Present: normal affect, normal mood


Skin exam: Present: warm, dry, intact, normal color.  Absent: rash





<Jenna Arreaga - Last Filed: 06/29/22 15:12>





Course


                                   Vital Signs











  06/28/22 06/28/22 06/28/22





  13:10 16:34 21:53


 


Temperature 97.3 F L 98.5 F 98.4 F


 


Pulse Rate 73 73 87


 


Pulse Rate [   





Pulse Oximetery   





]   


 


Respiratory 16 16 18





Rate   


 


Blood Pressure 101/55 122/57 137/70


 


Blood Pressure   





[Right Arm]   


 


O2 Sat by Pulse 99 93 L 93 L





Oximetry   














  06/28/22





  22:15


 


Temperature 98.7 F


 


Pulse Rate 


 


Pulse Rate [ 84





Pulse Oximetery 





] 


 


Respiratory 16





Rate 


 


Blood Pressure 


 


Blood Pressure 134/71





[Right Arm] 


 


O2 Sat by Pulse 97





Oximetry 














Medical Decision Making





- Lab Data


Result diagrams: 


                                 06/28/22 19:56





                                 06/28/22 19:56





- EKG Data


-: EKG Interpreted by Me





<Power Infante - Last Filed: 06/28/22 20:34>





- Lab Data


Result diagrams: 


                                 06/28/22 19:56





                                 06/28/22 19:56





<Jenna Arreaga - Last Filed: 06/29/22 15:12>





- Medical Decision Making


Patient is signed out to me pending results of CT of the left hip.  He 

experienced 2 falls while at work today.  The first fall was mechanical, second 

fall was when appears to be syncopal episode secondary to pain.  Imaging has 

already revealed that he has to left-sided rib fractures in addition to 

 left shoulder.  No other obvious injury.  Left hip x-rays revealed no 

obvious injuries.  However patient is still having extreme pain on ambulation.  

CT imaging was obtained and still pending upon sign out.  I followed up 

patient's CT imaging, and revealed no obvious left hip injury or pelvic injury. 

There is diverticulosis without diverticulitis.  No other findings.





I did the patient.  He is still unable to ambulate with the aid of a cane or 

walker.  Did recommend admission with orthopedic evaluation as well as possible 

MRI.  He was in agreement this plan.  I spoke with Dr. Yo who the patient 

states he has seen before and is on call, who was in agreement this plan.  

Recommended MRI pelvis, left hip without contrast.  I spoke with Dr. Lux for 

medical management of the patient.  Patient was therefore admitted to 

observation stable condition.  We will obtain basic laboratory studies as well 

as a screening EKG.Patient's EKG shows no signs of acute ischemia.  Laboratory 

studies are unremarkable.  Patient was admitted in stable condition. 

(Power Infante)





Upon arrival patient was placed into room 17.  There are history of physical 

exam is performed.  Patient arrived in a c-collar.  He is sent over for a CT of 

his head and cervical spine.  CT does read that the patient has a herniated disc

at C3-C4 contributing to moderate spinal canal stenosis.  I did review the 

patient's c-collar and he does have full range of motion without pain..  He is 

sent over for x-ray imaging of his shoulder, hip and pelvis.  Hips and 

demonstrates no acute fracture dislocation.  Shoulder x-ray demonstrates AC join

t separation.  Concern for possible chronic rotator cuff injury.  X-ray of the 

ribs demonstrates right sixth and seventh rib fractures with chronic ninth and 

10th rib fractures.  I attempted to ambulate the patient however upon seeing the

patient immediately wanted to fall over.  Has intense pain therefore we will CT 

the patient's pelvis and left hip.  Patient is signed out to Dr. Infante 

([facility]eth[facility] mcculloughah CLEVE)





- Lab Data


                                   Lab Results











  06/28/22 06/28/22 06/29/22 Range/Units





  19:56 19:56 07:03 


 


WBC  8.4    (3.8-10.6)  k/uL


 


RBC  4.35    (4.30-5.90)  m/uL


 


Hgb  13.7    (13.0-17.5)  gm/dL


 


Hct  41.5    (39.0-53.0)  %


 


MCV  95.4    (80.0-100.0)  fL


 


MCH  31.5    (25.0-35.0)  pg


 


MCHC  33.0    (31.0-37.0)  g/dL


 


RDW  13.8    (11.5-15.5)  %


 


Plt Count  282    (150-450)  k/uL


 


MPV  7.4    


 


Neutrophils %  82    %


 


Lymphocytes %  10    %


 


Monocytes %  5    %


 


Eosinophils %  1    %


 


Basophils %  1    %


 


Neutrophils #  6.9    (1.3-7.7)  k/uL


 


Lymphocytes #  0.9 L    (1.0-4.8)  k/uL


 


Monocytes #  0.5    (0-1.0)  k/uL


 


Eosinophils #  0.1    (0-0.7)  k/uL


 


Basophils #  0.0    (0-0.2)  k/uL


 


Sodium   135 L   (137-145)  mmol/L


 


Potassium   4.0   (3.5-5.1)  mmol/L


 


Chloride   105   ()  mmol/L


 


Carbon Dioxide   21 L   (22-30)  mmol/L


 


Anion Gap   9   mmol/L


 


BUN   22 H   (9-20)  mg/dL


 


Creatinine   0.78   (0.66-1.25)  mg/dL


 


Est GFR (CKD-EPI)AfAm   >90   (>60 ml/min/1.73 sqM)  


 


Est GFR (CKD-EPI)NonAf   >90   (>60 ml/min/1.73 sqM)  


 


Glucose   166 H   (74-99)  mg/dL


 


POC Glucose (mg/dL)    163 H  ()  mg/dL


 


POC Glu Operater ID    Abril Wagner  


 


Calcium   8.9   (8.4-10.2)  mg/dL


 


Magnesium   1.9   (1.6-2.3)  mg/dL


 


Troponin I     (0.000-0.034)  ng/mL














  06/29/22 Range/Units





  09:44 


 


WBC   (3.8-10.6)  k/uL


 


RBC   (4.30-5.90)  m/uL


 


Hgb   (13.0-17.5)  gm/dL


 


Hct   (39.0-53.0)  %


 


MCV   (80.0-100.0)  fL


 


MCH   (25.0-35.0)  pg


 


MCHC   (31.0-37.0)  g/dL


 


RDW   (11.5-15.5)  %


 


Plt Count   (150-450)  k/uL


 


MPV   


 


Neutrophils %   %


 


Lymphocytes %   %


 


Monocytes %   %


 


Eosinophils %   %


 


Basophils %   %


 


Neutrophils #   (1.3-7.7)  k/uL


 


Lymphocytes #   (1.0-4.8)  k/uL


 


Monocytes #   (0-1.0)  k/uL


 


Eosinophils #   (0-0.7)  k/uL


 


Basophils #   (0-0.2)  k/uL


 


Sodium   (137-145)  mmol/L


 


Potassium   (3.5-5.1)  mmol/L


 


Chloride   ()  mmol/L


 


Carbon Dioxide   (22-30)  mmol/L


 


Anion Gap   mmol/L


 


BUN   (9-20)  mg/dL


 


Creatinine   (0.66-1.25)  mg/dL


 


Est GFR (CKD-EPI)AfAm   (>60 ml/min/1.73 sqM)  


 


Est GFR (CKD-EPI)NonAf   (>60 ml/min/1.73 sqM)  


 


Glucose   (74-99)  mg/dL


 


POC Glucose (mg/dL)   ()  mg/dL


 


POC Glu Operater ID   


 


Calcium   (8.4-10.2)  mg/dL


 


Magnesium   (1.6-2.3)  mg/dL


 


Troponin I  <0.012  (0.000-0.034)  ng/mL














- EKG Data


EKG Comments: 





12-lead Electrocardiogram Interpretation Note





EKG was reviewed and interpreted by myself. 12-lead ECG performed at 1956 is 

interpreted by me as revealing normal sinus rhythm at a rate of 82 beats per 

minute.  Axis is normal.  NH interval is 144 ms, QRS duration is 109 ms, QTc is 

417 ms..  There were no ST or T wave abnormalities to suggest myocardial 

ischemia or injury. R wave progression across the precordium was satisfactory. 

By my interpretation this EKG is non-diagnostic for acute ischemia. 

(Power Infante)





Disposition


Time of Disposition: 19:06





<Power Infante - Last Filed: 06/28/22 20:34>





<Jenna Arreaga - Last Filed: 06/29/22 15:12>


Clinical Impression: 


 Fall, Hip pain, Rib fractures, Shoulder strain,  shoulder





Disposition: ADMITTED AS IP TO THIS HOSP


Condition: Stable

## 2022-06-28 NOTE — XR
EXAMINATION TYPE: XR Hip LT and AP Pelvis

 

DATE OF EXAM: 6/28/2022

 

COMPARISON: NONE

 

HISTORY: Fall and pain

 

TECHNIQUE: A single AP view of the pelvis is obtained. Two views of the left hip are obtained.  

 

FINDINGS:

Right total hip arthroplasty. Degenerative changes of the left hip joint. Mild degenerative changes o
f the right sacroiliac joint.

 

No definite acute pelvic bone or left hip bone fracture identified. Bilateral pelvic phleboliths. Art
erial atherosclerotic calcifications.

 

IMPRESSION:  There is no acute fracture or dislocation in the pelvis or right hip.

## 2022-06-28 NOTE — XR
EXAMINATION TYPE: XR shoulder complete LT

 

DATE OF EXAM: 6/28/2022

 

COMPARISON: NONE

 

INDICATION: Pain after fall

 

TECHNIQUE: 3 views of the left shoulder

 

FINDINGS: 

Mild gapping at the acromioclavicular joint, underlying subluxation or ligamentous injury cannot be e
xcluded, please correlate clinically. Osteopenia. Lateral humeral head metallic anchor.

 

Small bone fragment/calcification is seen at the lateral aspect of the humeral head measuring 5 mm. T
his is likely chronic. Mild degenerative changes of the glenohumeral articulation.

 

No definite acute fracture line identified. No humeral head dislocation. Reduced acromiohumeral dista
nce which can be seen in cases of chronic rotator cuff tear.

 

IMPRESSION: 

As above.

## 2022-06-28 NOTE — CT
EXAMINATION TYPE: CT brain cspine wo con

 

DATE OF EXAM: 6/28/2022

 

COMPARISON: CT neck 10/4/2021

 

HISTORY: 67-year-old male head injury, pain after Fall

 

CT DLP: 1614 mGycm

Automated exposure control for dose reduction was used.

 

Technique:  Examination of the head was done in axial plane without intravenous contrast. Coronal and
 sagittal reconstructions performed.

 

CT of the cervical spine was obtained in axial plane without intravenous injection of  contrast mater
ial.  Coronal and sagittal reformatted images were obtained from the axial views for evaluation of  f
ractures, spinal alignment and canal.

 

 

FINDINGS:

 

Head:

There is no evidence of  acute intracranial hemorrhage, acute ischemic changes, mass effect, or extra
-axial fluid collection.  There is no effacement of cerebral sulci or basal subarachnoid cisterns.  T
here is no hydrocephalus.  There is no midline shift.  Gray-white matter distinction is preserved.

 

Mild generalized cerebral cortical volume loss.

 

There is a 3.8 x 1.7 cm CSF density space along the anterior aspect of the left middle cranial fossa 
suggestive of an arachnoid cyst.

 

Moderate mucosal thickening throughout the ethmoid air cells. Mastoid air cells well pneumatized. Rig
htward nasal septal deviation. Orbits and globes are intact.

 

Mild left posterior scalp contusion. No underlying calvarial fracture.

 

 

Cervical spine:

No craniocervical junction abnormality, predental space widening, or prevertebral soft tissue swellin
g. Degenerative changes at the C1 dens articulation.

 

Moderate disc/endplate degenerative change C5-C6 and C6/C7. Assessment of the spinal canal from C5 an
d below is limited due to artifact from the patient's shoulders.

 

Hypertrophic uncovertebral joint arthropathy mid to lower cervical spine.

 

There is the appearance of a central disc herniation at C3-C4 narrowing the spinal canal to 6 mm AP.

 

No acute fracture seen of the cervical spine. Alignment is maintained.

 

Vertebral moderate neuroforaminal stenoses particularly at C5-C6 and C6/C7 and also at C3-C4.

 

Sagittal and coronal reformatted images confirm above findings.

 

 

COMBINED IMPRESSION:

1. Mild left posterior scalp contusion. There is mild generalized cerebral atrophy. No acute intracra
nial abnormality seen.

 

2. No acute fracture or malalignment of the cervical spine.

 

3. Possible focal disc herniation at C3-C4 contributing to a moderate spinal canal stenosis. If sympt
omatic, consider cervical spine MRI. Additional moderate spondylotic changes detailed above.

 

4. Moderate chronic ethmoid sinus disease.

## 2022-06-29 LAB
GLUCOSE BLD-MCNC: 133 MG/DL (ref 70–110)
GLUCOSE BLD-MCNC: 137 MG/DL (ref 70–110)
GLUCOSE BLD-MCNC: 163 MG/DL (ref 70–110)
GLUCOSE BLD-MCNC: 206 MG/DL (ref 70–110)

## 2022-06-29 RX ADMIN — METOPROLOL TARTRATE SCH MG: 50 TABLET, FILM COATED ORAL at 09:07

## 2022-06-29 RX ADMIN — INSULIN ASPART SCH UNIT: 100 INJECTION, SUSPENSION SUBCUTANEOUS at 18:15

## 2022-06-29 RX ADMIN — HEPARIN SODIUM SCH: 5000 INJECTION INTRAVENOUS; SUBCUTANEOUS at 18:11

## 2022-06-29 RX ADMIN — INSULIN ASPART SCH UNIT: 100 INJECTION, SOLUTION INTRAVENOUS; SUBCUTANEOUS at 13:09

## 2022-06-29 RX ADMIN — FAMOTIDINE SCH MG: 20 TABLET, FILM COATED ORAL at 09:07

## 2022-06-29 RX ADMIN — INSULIN ASPART SCH UNIT: 100 INJECTION, SOLUTION INTRAVENOUS; SUBCUTANEOUS at 22:00

## 2022-06-29 RX ADMIN — HYDROMORPHONE HYDROCHLORIDE PRN MG: 1 INJECTION, SOLUTION INTRAMUSCULAR; INTRAVENOUS; SUBCUTANEOUS at 01:53

## 2022-06-29 RX ADMIN — HEPARIN SODIUM SCH UNIT: 5000 INJECTION INTRAVENOUS; SUBCUTANEOUS at 09:07

## 2022-06-29 RX ADMIN — FAMOTIDINE SCH MG: 20 TABLET, FILM COATED ORAL at 19:35

## 2022-06-29 RX ADMIN — HYDROMORPHONE HYDROCHLORIDE PRN MG: 1 INJECTION, SOLUTION INTRAMUSCULAR; INTRAVENOUS; SUBCUTANEOUS at 19:36

## 2022-06-29 RX ADMIN — NICOTINE SCH: 21 PATCH, EXTENDED RELEASE TRANSDERMAL at 13:06

## 2022-06-29 RX ADMIN — LIDOCAINE SCH PATCH: 50 PATCH TOPICAL at 09:06

## 2022-06-29 RX ADMIN — INSULIN ASPART SCH: 100 INJECTION, SOLUTION INTRAVENOUS; SUBCUTANEOUS at 18:11

## 2022-06-29 RX ADMIN — HYDROMORPHONE HYDROCHLORIDE PRN MG: 1 INJECTION, SOLUTION INTRAMUSCULAR; INTRAVENOUS; SUBCUTANEOUS at 06:53

## 2022-06-29 RX ADMIN — PIOGLITAZONE SCH MG: 30 TABLET ORAL at 09:07

## 2022-06-29 RX ADMIN — METOPROLOL TARTRATE SCH MG: 50 TABLET, FILM COATED ORAL at 19:35

## 2022-06-29 RX ADMIN — HEPARIN SODIUM SCH UNIT: 5000 INJECTION INTRAVENOUS; SUBCUTANEOUS at 19:36

## 2022-06-29 RX ADMIN — ASPIRIN 81 MG CHEWABLE TABLET SCH MG: 81 TABLET CHEWABLE at 09:07

## 2022-06-29 RX ADMIN — ASPIRIN SCH: 325 TABLET ORAL at 09:07

## 2022-06-29 RX ADMIN — INSULIN ASPART SCH UNIT: 100 INJECTION, SUSPENSION SUBCUTANEOUS at 09:14

## 2022-06-29 RX ADMIN — INSULIN DETEMIR SCH: 100 INJECTION, SOLUTION SUBCUTANEOUS at 21:59

## 2022-06-29 NOTE — CA
Transthoracic Echo Report 

 Name: Tito Currie 

 MRN:    [id number] 

 Age:    67     Gender:     M 

 

 :    1955 

 Exam Date:     2022 09:07 

 Exam Location: [facility] 

 Ht (in):     70     Wt (lb):     210 

 Ordering Physician:        Anastacia Beavers 

 Attending/Referring Phys:         [id number], Nimesh 

 Technician         Maine Smith RDCS 

 Procedure CPT: 

 Indications:       LVF 

 

 Cardiac Hx: 

 Technical Quality:      Very technically difficult study 

 Contrast 1:    Lumason                     Total Dose (mL):      4 

 Contrast 2:                                Total Dose (mL): 

 

 MEASUREMENTS  (Male / Female) Normal Values 

 2D ECHO 

 LV Diastolic Diameter PLAX        4.7 cm                4.2 - 5.9 / 3.9 - 5.3 cm 

 LV Systolic Diameter PLAX         3.4 cm                 

 IVS Diastolic Thickness           1.3 cm                0.6 - 1.0 / 0.6 - 0.9 cm 

 LVPW Diastolic Thickness          1.4 cm                0.6 - 1.0 / 0.6 - 0.9 cm 

 LV Relative Wall Thickness        0.6                    

 

 M-MODE 

 Aortic Root Diameter MM           3.0 cm                 

 LA Systolic Diameter MM           4.1 cm                 

 LA Ao Ratio MM                    1.4                    

 

 DOPPLER 

 AV Peak Velocity                  86.2 cm/s              

 AV Peak Gradient                  3.0 mmHg               

 LVOT Peak Velocity                92.3 cm/s              

 LVOT Peak Gradient                3.4 mmHg               

 MV Area PHT                       2.6 cm???                

 Mitral E Point Velocity           64.3 cm/s              

 Mitral A Point Velocity           77.6 cm/s              

 Mitral E to A Ratio               0.8                    

 MV Deceleration Time              291.5 ms               

 TR Peak Velocity                  133.4 cm/s             

 TR Peak Gradient                  7.1 mmHg               

 Right Ventricular Systolic Press  12.1 mmHg              

 

 

 FINDINGS 

 Left Ventricle 

 Mildly increased left ventricular wall thickness. Left ventricular ejection  

 fraction is estimated at 50-55 %. 

 

 Right Ventricle 

 Right ventricle not well visualized. Right ventricular systolic pressure within  

 normal limits. 

 

 Right Atrium 

 Right atrium not well visualized. 

 

 Left Atrium 

 Mild left atrial dilatation. Left atrium not well visualized. 

 

 Mitral Valve 

 Mitral valve not well visualized. No mitral stenosis. 

 

 Aortic Valve 

 Aortic valve not well visualized. No aortic stenosis. No aortic regurgitation. 

 

 Tricuspid Valve 

 Tricuspid valve not well visualized. Mild tricuspid regurgitation. 

 

 Pulmonic Valve 

 Pulmonic valve not well visualized. 

 

 Pericardium 

 No pericardial effusion. 

 

 Aorta 

 Aortic root and proximal ascending aorta not well visualized. 

 

 CONCLUSIONS 

 Technically suboptimal and incomplete study LV systolic function appears normal 

 Previewed by:  

 Dr. Shiv Haney MD 

 (Electronically Signed) 

 Final Date:      2022 11:56

## 2022-06-29 NOTE — P.CNOR
History of Present Illness





- [facility]


Consult date: 06/29/22


Consult reason: joint pain


History of present illness: 


Patient is a 67-year-old male seen at bedside this morning regarding his left 

hip pain.He presents to the emergency department yesterday, 6/28/22 after he had

a trip and fall.  He was at work where he sustained a fall.  He fell backwards 

and hit his left hip.  He then attempted to get up and was having significant 

pain.  He stood for a few minutes before he ended up having a syncopal episode. 

The second time he fell, he fell onto his right chest wall and hit his head.  He

did lose consciousness.  Patient is on a blood thinners.  He is complaining of 

left shoulder, right rib and left hip pain. Xrays showed no fracture of the hip 

or shoulder. He denies numbness or tingling. he has no other current complaints








Review of Systems


All systems: negative


Constitutional: Denies chills, Denies fever


Eyes: denies blurred vision, denies pain


Ears, nose, mouth and throat: Denies headache, Denies sore throat


Cardiovascular: Denies chest pain, Denies shortness of breath


Respiratory: Denies cough


Gastrointestinal: Denies abdominal pain, Denies diarrhea, Denies nausea, Denies 

vomiting


Musculoskeletal: Denies myalgias


Integumentary: Denies pruritus, Denies rash


Neurological: Denies numbness, Denies weakness


Psychiatric: Denies anxiety, Denies depression


Endocrine: Denies fatigue, Denies weight change





Past Medical History


Past Medical History: Coronary Artery Disease (CAD), Diabetes Mellitus, Hyperl

ipidemia, Hypertension, Myocardial Infarction (MI)


Additional Past Medical History / Comment(s): covid


Last Myocardial Infarction Date:: 2012


History of Any Multi-Drug Resistant Organisms: MRSA


Year Discovered:: 9/21


MDRO Source:: neck


Past Surgical History: Heart Catheterization With Stent, Joint Replacement, 

Orthopedic Surgery, Tonsillectomy


Additional Past Surgical History / Comment(s): states both rotator cuffs done


Date of Last Stent Placement:: 2012


Past Psychological History: No Psychological Hx Reported


Smoking Status: Current every day smoker


Past Alcohol Use History: Rare


Past Drug Use History: Marijuana





Medications and Allergies


                                Home Medications











 Medication  Instructions  Recorded  Confirmed  Type


 


Aspirin EC [Ecotrin] 81 mg PO DAILY 03/04/15 06/28/22 History


 


Metoprolol Tartrate [Lopressor] 50 mg PO BID 03/04/15 06/28/22 History


 


Simvastatin [Zocor] 40 mg PO MOWEFR 03/04/15 06/28/22 History


 


lisinopriL [Prinivil] 20 mg PO DAILY 03/04/15 06/28/22 History


 


Canagliflozin [Invokana] 300 mg PO DAILY 10/04/21 06/28/22 History


 


Famotidine [Pepcid] 20 mg PO BID 10/04/21 06/28/22 History


 


Insulin Glargine,Hum.rec.anlog 40 unit SQ HS 10/04/21 06/28/22 History





[Basaglar Kwikpen U-100]    


 


Insuln Asp Prt/Insulin Aspart 5 unit SQ AC-BID 10/04/21 06/28/22 History





[NovoLOG MIX 70-30 VIAL]    


 


Pioglitazone [Actos] 30 mg PO DAILY 10/04/21 06/28/22 History


 


Semaglutide [Ozempic] 1 mg SQ SA 06/28/22 06/28/22 History


 


metFORMIN HCL ER [Glucophage XR] 1,000 mg PO BID 06/28/22 06/28/22 History








                                    Allergies











Allergy/AdvReac Type Severity Reaction Status Date / Time


 


No Known Allergies Allergy   Verified 10/04/21 11:03














Physical Examination


Inspection of left hip and lower extremity shows no deformity, wounds, erythema 

or echymoses. There is some tenderness in the left buttock and outer hip. He has

no groin pain with ROM of the hip. Negative straightleg raise. Motor and 

sensation intact throughout left lower extremity. Knee, ankle and foot are 

benign. Calf SNT, pulses an capillary refill intact. 








Inspection of left shoulder and upper extremity shows no deformity, wounds, 

erythema or echymoses. There is no tenderness. There is some generalized left 

shoulder pain with ROM. Motor and sensation intact throughout left upper 

extremity. Elbow, wrist and hand are benign. pulses an capillary refill intact





Results





- Labs


Labs: 


                  Abnormal Lab Results - Last 24 Hours (Table)











  06/28/22 06/28/22 06/29/22 Range/Units





  19:56 19:56 07:03 


 


Lymphocytes #  0.9 L    (1.0-4.8)  k/uL


 


Sodium   135 L   (137-145)  mmol/L


 


Carbon Dioxide   21 L   (22-30)  mmol/L


 


BUN   22 H   (9-20)  mg/dL


 


Glucose   166 H   (74-99)  mg/dL


 


POC Glucose (mg/dL)    163 H  ()  mg/dL














  06/29/22 Range/Units





  11:35 


 


Lymphocytes #   (1.0-4.8)  k/uL


 


Sodium   (137-145)  mmol/L


 


Carbon Dioxide   (22-30)  mmol/L


 


BUN   (9-20)  mg/dL


 


Glucose   (74-99)  mg/dL


 


POC Glucose (mg/dL)  206 H  ()  mg/dL








                                      H & H











  06/28/22 Range/Units





  19:56 


 


Hgb  13.7  (13.0-17.5)  gm/dL


 


Hct  41.5  (39.0-53.0)  %











Result Diagrams: 


                                 06/28/22 19:56





                                 06/28/22 19:56





- Diagnostic results


Shoulder x-ray: report reviewed, image reviewed


Hip x-ray: report reviewed, image reviewed





Assessment and Plan


(1) Hip pain


Narrative/Plan: 


MRI of left hip has been ordered. No definite fractures seen in hip or shoulder.

Activity as tolerated with left shoulder. Will review MRI findings and make 

further recommendations as appropriate. Continue pain management and medical 

management.


Current Visit: Yes   Status: Acute   Priority: Medium   Code(s): M25.559 - PAIN 

IN UNSPECIFIED HIP   SNOMED Code(s): 55724384


   





(2) Shoulder strain


Current Visit: Yes   Status: Acute   Priority: Medium   Code(s): S46.919A - 

STRAIN UNSP MUSC/FASC/TEND AT SHLDR/UP ARM, UNSP ARM, INIT   SNOMED Code(s): 

026883839

## 2022-06-29 NOTE — US
EXAMINATION TYPE: US carotid duplex BILAT

 

DATE OF EXAM: 6/29/2022

 

COMPARISON: NONE

 

CLINICAL HISTORY: LVF. black out episode.

 

EXAM MEASUREMENTS: 

 

RIGHT:  Peak Systolic Velocity (PSV) cm/sec

----- Right CCA:  81.2  

----- Right ICA:  92.2     

----- Right ECA:  125.0   

ICA/CCA ratio:  1.14    

 

RIGHT:  End Diastole cm/sec

----- Right CCA:  17.5   

----- Right ICA:  14.9      

----- Right ECA:  14.3     

 

LEFT:  Peak Systolic Velocity (PSV) cm/sec

----- Left CCA:  97.5  

----- Left ICA:  89.8   

----- Left ECA:  151.0  

ICA/CCA ratio:  0.92  

 

LEFT:  End Diastole cm/sec

----- Left CCA:  13.7  

----- Left ICA:  18.8   

----- Left ECA:  8.2 

 

VERTEBRALS (direction of flow):

Right Vertebral: Antegrade

Left Vertebral: Antegrade

 

Rhythm:  Normal

 

No significant stenosis seen. Elevates left ECA velocity. Mild bilateral plaque.

 

 

 

IMPRESSION:  

1. Atheromatous plaquing without significant flow-limiting stenosis.   

 

 

Criteria for Assigning % of Stenosis / Diameter reduction

(Estimation based on the indirect measurements of the internal carotid artery velocities (ICA PSV).

1.  Normal (no stenosis)=ICA PSV < 125 cm/s: ratio < 2.0: ICA EDV<40 cm/s.

2. Less than 50% stenosis=ICA PSV < 125 cm/s: ratio < 2.0: ICA EDV<40 cm/s.

3.  50 to 69% stenosis=ICA PSV of 125 to 230 cm/s: ration 2.0 ? 4.0: ICA EDV  cm/s.

4.  Greater than 70% stenosis to near occlusion= ICA PSV > 230 cm/s: ratio > 4.0: ICA EDV > 100 cm/s.
 

5.  Near occlusion= ICA PSV velocities may be low or undetectable: variable ratio and ICA EDV.

6.  Total occlusion=unable to detect flow.

## 2022-06-29 NOTE — P.CRDCN
History of Present Illness


Consult date: 06/29/22


History of present illness: 





HISTORY OF PRESENT ILLNESS:  





This is a 67-year-old male with a past medical history significant for 

hypertension, hyperlipidemia, diabetes, nicotine dependence, and coronary artery

disease with previous stent placement, last in 2012. Patient follows in the 

office with Dr. Haney. We have been asked to see the patient in consultation for

syncope. Patient examined at the bedside. Patient states he was working with his

brother moving something heavy. He is unsure if he lost his balance or tripped, 

but nevertheless, he fell and landed on his hip. He tried to get up but the pain

was too much and the patient ended up passing out. He reports hitting his right 

shoulder at that time. He reports loss of consciousness.  Patient denies any 

chest pain or pressure this point.  He denies shortness of breath.  Denies any 

dizziness or lightheadedness.





* EKG reveals sinus mechanism with no signs of acute ischemia.


* Chest xray right sixth and seventh rib fractures


* Laboratory data: WBC 8.4.  Hemoglobin 13.7.  Platelet count 282.  Sodium 135. 

  Sodium 4.0.  B UN 22.  Creatinine 0.78.  Magnesium 1.9.  Troponin negative 1.


* Current home cardiac medications include metoprolol tartrate 50 mg twice a 

  day, lisinopril 20 mg daily, aspirin 81 mg daily, simvastatin 40 mg Monday Wednesday Friday





REVIEW OF SYSTEMS: 


At the time of my exam:


CONSTITUTIONAL: Denies fever or chills.


HEENT: Denies blurred vision, vision changes, or eye pain. Denies hemoptysis 


CARDIOVASCULAR: Denies chest pain. Denies orthopnea. Denies PND. Denies 

palpitations


RESPIRATORY: Denies shortness of breath. 


GASTROINTESTINAL: Denies abdominal pain. Denies nausea or vomiting. 


HEMATOLOGIC: Denies bleeding disorders.


GENITOURINARY:  Denies any blood in urine.


SKIN: Denies pruitis. Denies rash.





PHYSICAL EXAM: 


VITAL SIGNS: Reviewed.


GENERAL: Well-developed in no acute distress. 


HEENT: Head is normocephalic. Pupils are equal, round. Sclerae anicteric. Mucous

membranes of the mouth are moist. Neck supple. No JVD or thyromegaly


LUNGS: Respirations even and unlabored. Lungs essentially clear to auscultation 

bilaterally.


HEART: Regular rate and rhythm.  S1 and S2 heard.


ABDOMEN: Soft. Nondistended. Nontender.


EXTREMITIES: Normal range of motion.  No clubbing or cyanosis.  Peripheral 

pulses intact.  No lower extremity edema


NEUROLOGIC: Awake and alert. Oriented x 3. 





ASSESSMENT: 


Syncope, suspect vasovagal in nature


Mechanical fall with subsequent hip pain


Right sixth and seventh rib fractures


Coronary artery disease with previous stenting


Hypertension


Hyperlipidemia


Diabetes


Nicotine dependence





PLAN: 


Obtain 2-D echo to assess cardiac structure and function


Telemetry monitoring initiated.  Assess for any arrhythmias.


Check orthostatic blood pressures


Further recommendations pending patient's course








Nurse practitioner note has been reviewed by physician. Signing provider agrees 

with the documented findings, assessment, and plan of care. 








Past Medical History


Past Medical History: Coronary Artery Disease (CAD), Diabetes Mellitus, 

Hyperlipidemia, Hypertension, Myocardial Infarction (MI)


Additional Past Medical History / Comment(s): covid


Last Myocardial Infarction Date:: 2012


History of Any Multi-Drug Resistant Organisms: MRSA


Date of last positivie culture/infection: 9/21


MDRO Source:: neck


Past Surgical History: Heart Catheterization With Stent, Joint Replacement, 

Orthopedic Surgery, Tonsillectomy


Additional Past Surgical History / Comment(s): states both rotator cuffs done


Date of Last Stent Placement:: 2012


Past Psychological History: No Psychological Hx Reported


Smoking Status: Current every day smoker


Past Alcohol Use History: Rare


Past Drug Use History: Marijuana





Medications and Allergies


                                Home Medications











 Medication  Instructions  Recorded  Confirmed  Type


 


Aspirin EC [Ecotrin] 81 mg PO DAILY 03/04/15 06/28/22 History


 


Metoprolol Tartrate [Lopressor] 50 mg PO BID 03/04/15 06/28/22 History


 


Simvastatin [Zocor] 40 mg PO MOWEFR 03/04/15 06/28/22 History


 


lisinopriL [Prinivil] 20 mg PO DAILY 03/04/15 06/28/22 History


 


Canagliflozin [Invokana] 300 mg PO DAILY 10/04/21 06/28/22 History


 


Famotidine [Pepcid] 20 mg PO BID 10/04/21 06/28/22 History


 


Insulin Glargine,Hum.rec.anlog 40 unit SQ HS 10/04/21 06/28/22 History





[Basaglar Kwikpen U-100]    


 


Insuln Asp Prt/Insulin Aspart 5 unit SQ AC-BID 10/04/21 06/28/22 History





[NovoLOG MIX 70-30 VIAL]    


 


Pioglitazone [Actos] 30 mg PO DAILY 10/04/21 06/28/22 History


 


Semaglutide [Ozempic] 1 mg SQ SA 06/28/22 06/28/22 History


 


metFORMIN HCL ER [Glucophage XR] 1,000 mg PO BID 06/28/22 06/28/22 History








                                    Allergies











Allergy/AdvReac Type Severity Reaction Status Date / Time


 


No Known Allergies Allergy   Verified 10/04/21 11:03














Physical Exam


Vitals: 


                                   Vital Signs











  Temp Pulse Pulse Resp BP BP Pulse Ox


 


 06/29/22 07:28  98.1 F   77  16   127/70  93 L


 


 06/29/22 01:20  98.3 F   84  16   132/71  95


 


 06/28/22 22:15  98.7 F   84  16   134/71  97


 


 06/28/22 21:53  98.4 F  87   18  137/70   93 L


 


 06/28/22 16:34  98.5 F  73   16  122/57   93 L


 


 06/28/22 13:10  97.3 F L  73   16  101/55   99








                                Intake and Output











 06/28/22 06/29/22 06/29/22





 22:59 06:59 14:59


 


Intake Total   118


 


Output Total   275


 


Balance   -157


 


Intake:   


 


  Oral   118


 


Output:   


 


  Urine   275


 


Other:   


 


  Voiding Method  Urinal 


 


  # Voids  2 


 


  Weight 95.254 kg  














Results





                                 06/28/22 19:56





                                 06/28/22 19:56


                                 Cardiac Enzymes











  06/29/22 Range/Units





  09:44 


 


Troponin I  <0.012  (0.000-0.034)  ng/mL








                                       CBC











  06/28/22 Range/Units





  19:56 


 


WBC  8.4  (3.8-10.6)  k/uL


 


RBC  4.35  (4.30-5.90)  m/uL


 


Hgb  13.7  (13.0-17.5)  gm/dL


 


Hct  41.5  (39.0-53.0)  %


 


Plt Count  282  (150-450)  k/uL








                          Comprehensive Metabolic Panel











  06/28/22 Range/Units





  19:56 


 


Sodium  135 L  (137-145)  mmol/L


 


Potassium  4.0  (3.5-5.1)  mmol/L


 


Chloride  105  ()  mmol/L


 


Carbon Dioxide  21 L  (22-30)  mmol/L


 


BUN  22 H  (9-20)  mg/dL


 


Creatinine  0.78  (0.66-1.25)  mg/dL


 


Glucose  166 H  (74-99)  mg/dL


 


Calcium  8.9  (8.4-10.2)  mg/dL








                               Current Medications











Generic Name Dose Route Start Last Admin





  Trade Name Freq  PRN Reason Stop Dose Admin


 


Acetaminophen  650 mg  06/29/22 08:00 





  Acetaminophen Tab 325 Mg Tab  PO  





  Q6HR PRN  





  Fever and/ or MILD Pain  


 


Hydrocodone Bitart/Acetaminophen  1 each  06/29/22 08:00 





  Hydrocodone/Apap 5-325mg 1 Each Tab  PO  





  Q6HR PRN  





  MODERATE Pain  


 


Aspirin  81 mg  06/29/22 09:00  06/29/22 09:07





  Aspirin 81 Mg  PO   81 mg





  DAILY KAYLIE   Administration


 


Famotidine  20 mg  06/28/22 21:00  06/29/22 09:07





  Famotidine 20 Mg Tab  PO   20 mg





  BID KAYLIE   Administration


 


Heparin Sodium (Porcine)  5,000 unit  06/29/22 00:00  06/29/22 09:07





  Heparin Sodium,Porcine/Pf 5,000 Unit/0.5 Ml Syringe  SQ   5,000 unit





  Q8HR KAYLIE   Administration


 


Hydromorphone HCl  1 mg  06/28/22 19:12  06/29/22 06:53





  Hydromorphone 1 Mg/Ml 1 Ml Syringe  IVP   1 mg





  Q3HR PRN   Administration





  Severe Pain  


 


Insulin Aspart  5 unit  06/29/22 07:30  06/29/22 09:14





  Insuln Asp Prt/Insulin Aspart 100 Unit/Ml 10 Ml Vial  SQ   5 unit





  AC-BID KAYLIE   Administration


 


Insulin Aspart  0 unit  06/29/22 12:30 





  Insulin Aspart (Novolog) 100 Unit/Ml Vial  SQ  





  [facility]  





  Protocol  


 


Insulin Detemir  40 unit  06/28/22 21:00  06/28/22 22:46





  Insulin Detemir (Levemir) 100 Unit/Ml Syr  SQ   40 unit





  HS KAYLIE   Administration


 


Lidocaine  1 patch  06/28/22 19:30  06/29/22 09:06





  Lidocaine 5% Patch  TOPICAL   1 patch





  DAILY [facility]   Administration





  Protocol  


 


Lisinopril  20 mg  06/29/22 09:00  06/29/22 09:07





  Lisinopril 20 Mg Tab  PO   20 mg





  DAILY KAYLIE   Administration


 


Metformin HCl  1,000 mg  06/28/22 21:00  06/29/22 09:07





  Metformin 500 Mg Tab  PO   1,000 mg





  BID KAYLIE   Administration


 


Metoprolol Tartrate  50 mg  06/28/22 21:00  06/29/22 09:07





  Metoprolol Tartrate 50 Mg Tab  PO   50 mg





  BID KAYLIE   Administration


 


Naloxone HCl  0.2 mg  06/28/22 19:12 





  Naloxone 0.4 Mg/Ml 1 Ml Vial  IV  





  Q2M PRN  





  Opioid Reversal  


 


Nicotine  1 patch  06/29/22 10:00 





  Nicotine 21mg/24hr Patch  TRANSDERM  





  DAILY KAYLIE  


 


Patient's Own (  300 mg  06/29/22 09:00  06/29/22 09:07





Canagliflozin [  PO   Not Given





Invokana] 300 Mg  DAILY KAYLIE  





Tablet)   


 


Patient's Own (  1 mg  07/02/22 19:27 





Semaglutide [Ozempic  SQ  





] 1 Mg/0.75 Ml Each)  SA KAYLIE  


 


Ondansetron HCl  4 mg  06/28/22 19:12 





  Ondansetron 4 Mg/2 Ml Vial  IVP  





  Q8HR PRN  





  Nausea And Vomiting  


 


Pioglitazone HCl  30 mg  06/29/22 09:00  06/29/22 09:07





  Pioglitazone 30 Mg Tab  PO   30 mg





  DAILY KAYLIE   Administration








                                Intake and Output











 06/28/22 06/29/22 06/29/22





 22:59 06:59 14:59


 


Intake Total   118


 


Output Total   275


 


Balance   -157


 


Intake:   


 


  Oral   118


 


Output:   


 


  Urine   275


 


Other:   


 


  Voiding Method  Urinal 


 


  # Voids  2 


 


  Weight 95.254 kg  








                                        





                                 06/28/22 19:56 





                                 06/28/22 19:56

## 2022-06-29 NOTE — P.CONS
History of Present Illness





- Reason for Consult


Consult date: 22


Medical management





- Chief Complaint


Fall, syncope, left hip pain, chest pain





- History of Present Illness





HISTORY OF PRESENT ILLNESS


This is a 67-year-old male patient of Dr. Singh and Dr. KWADWO Haney with past medical 

history of diabetes mellitus type 2 (follows with Dr. INDERJIT Goss), hypertension, 

hyperlipidemia, gastroesophageal reflux disease, coronary artery disease with 

myocardial infarction in 2012 status post 3 stent placement in  followed by 

2 stents in 2012, active tobacco use and dependence.  Patient states that he has

lost 50 pounds since he has been on Ozempic with drop in hemoglobin A1c from 10-

7.4.  Patient presented to the emergency center after trip and fall at work at 

[employer] where he was moving something heavy, falling backwards and 

hit his left hip.  Patient questions whether his left leg gave out on him or he 

lost his balance.  When he attempted to get up he was having significant pain, 

felt dizzy and subsequently had a syncopal event falling onto his right chest 

and hit his head with loss of consciousness.  Patient complained of left 

shoulder, right hip and left hip pain.  Patient's cardiologist is Dr. hilda suggs 

last saw him about few weeks ago.  No medication changes at that time.





Patient was found to be afebrile, heart rate in the 70s, blood pressure 101/55, 

pulse ox 99% on room air.  EKG sinus rhythm with no acute ST changes.


CBC unremarkable.  Sodium 135, potassium 4.0, CO2 21, BUN 22 creatinine 0.78.  

Blood sugar 166.


CT of the head and cervical spine revealed mild left posterior scalp contusion. 

Mild generalized cerebral atrophy.  No acute intracranial abnormality.  No acute

fracture or malalignment of the cervical spine.  Possible focal disc herniation 

of C3-C4 contributing to moderate spinal canal stenosis.  If symptomatic 

consider cervical spine MRI.  Moderate chronic ethmoid sinus disease.


Chest x-ray with right rib x-rays revealed right sixth and seventh rib acute 

fractures.


CAT scan of the left hip revealed mild osteoarthritis.  No evidence of hip 

fracture.


CAT scan of the pelvis revealed no acute bony abnormality.  No left hip 

fracture.  Sigmoid diverticulosis without diverticulitis.  Dilated urinary 

bladder suggestive of bladder outlet obstruction.


Patient was unable to ambulate with cane or walker, patient admitted to the 

[facility], orthopedics and MRI of the hip ordered, consult with cardiology 

added.


.





REVIEW OF SYSTEMS


Constitutional: No fever, no chills, no night sweats.  No weight change.  No 

weakness, fatigue or lethargy.  No daytime sleepiness.


EENT: No headache.  No blurred vision or double vision, no loss of vision.  No 

loss of Hearing, no ringing in the ears, no dizziness.  No nasal drainage or 

congestion.  No epistaxis.  No sore throat.


Lungs: No shortness of breath, cough, no sputum production.  No wheezing.


Cardiovascular: Reports chest pain, no lower extremity edema.  No palpitations. 

No paroxysmal nocturnal dyspnea.  No orthopnea.  No lightheadedness or 

dizziness.  Reports syncopal episode x1.


Abdominal: No abdominal pain.  No nausea, vomiting.  No diarrhea.  No 

constipation.  No bloody or tarry stools.  No loss of appetite.


Genitourinary: No dysuria, increased frequency, urgency.  No urinary retention.


Musculoskeletal: No myalgias.  No muscle weakness, no gait dysfunction, no 

frequent falls.  No back pain.  No neck pain.  Report right hip pain, left 

shoulder pain


Integumentary: No wounds, no lesions.  No rash or pruritus.  No unusual 

bruising.  No change in hair or nails.


Neurologic: No aphasia. No facial droop. No change in mentation. No head injury.

No headache. No paralysis. No paresthesia.


Psychiatric: No depression.  No anxiety.  No mood swings.


Endocrine: No abnormal blood sugars.  No weight change.  No excessive sweating 

or thirst.  No cold intolerance.  





SOCIAL HISTORY


Patient is a smoker of 1-1/2 packs of cigarettes per day for greater than 30 

years, occasional alcohol use.  He has had a low dose CT done last year which 

was negative.





FAMILY HISTORY


Mother  at age 83 from old age.  Father  at age 45 from motor vehicle 

accident and also had a CVA at age 40.  Patient has one brother with coronary 

artery disease status post CABG.  He has 2 sisters with no major medical 

problems.  Patient has 3 sons with no major medical problems.





PHYSICAL EXAMINATION


Gen: This is a 67-year-old  male.  He is resting in bed appears to be 

comfortable and in no acute distress.


HEENT: Head is atraumatic, normocephalic. Pupils equal, round. Sclerae is 

anicteric. 


NECK: Supple. No JVD. No lymphadenopathy. No thyromegaly. 


LUNGS: Clear to auscultation. No wheezes or rhonchi.  No intercostal 

retractions.


HEART: Regular rate and rhythm. No murmur. 


ABDOMEN: Soft. Bowel sounds are present. No masses.  No tenderness.


EXTREMITIES: No pedal edema.  No calf tenderness.


NEUROLOGICAL: Patient is awake, alert and oriented x3. Cranial nerves 2 through 

12 are grossly intact. 





ASSESSMENT AND PLAN


1.  Mechanical fall resulting in left hip pain and inability to ambulate 

concerning for hip fracture.  CAT scan is negative for fracture, MRI of the left

hip and pelvis been ordered.  Continue lidocaine patch, Dilaudid IV as needed, 

Norco and Tylenol ordered as needed.





2.  Right-sided 6 and 7 rib fractures.  Incentive spirometry added.





3.  Syncopal episode most likely vasovagal.  Consult with cardiology added.  

Echocardiogram, carotid ultrasound ordered, cardiac monitoring added.





4.  Shoulder pain, left.  Arm sling, orthopedics evaluation.





5.  Concussion with loss of consciousness.  Continue neuro checks per protocol. 

CAT scan of the brain negative for acute abnormality.





6.  Diabetes mellitus type 2.  Continue Levemir 40 units at bedtime, NovoLog mix

7035 units twice daily before meals, metformin 1000 mg twice daily, Actos 30 mg 

daily.  Patient may take his own home medication of Invokana 300 mg daily.





7.  Hypertension.  Continue lisinopril 20 mg daily, Lopressor 50 mg twice daily.





8.  Hyperlipidemia.  Continue simvastatin 40 mg .





9.  Coronary artery disease status post MI, stent placement 5.  Continue 

aspirin 81 mg daily, Zocor 40 mg , Lopressor 50 mg twice 

daily.





10.  Gastroesophageal reflux disease and GI prophylaxis.  Continue Pepcid 20 mg 

twice daily.





11.  Tobacco use and dependence.  Nicotine patch, smoking cessation.





12.  DVT prophylaxis.  Heparin subcu.





Patient will be admitted to the hospital for a minimum of 2 night stay.





DISCHARGE PLAN


Return home most likely on Wednesday.





Impression and plan of care have been directed as dictated by the signing 

physician.  Anastacia Beavers nurse practitioner acting as scribe for signing 

physician.





Past Medical History


Past Medical History: Coronary Artery Disease (CAD), Diabetes Mellitus, 

Hyperlipidemia, Hypertension, Myocardial Infarction (MI)


Additional Past Medical History / Comment(s): covid


Last Myocardial Infarction Date:: 


History of Any Multi-Drug Resistant Organisms: MRSA


Year Discovered:: 


MDRO Source:: neck


Past Surgical History: Heart Catheterization With Stent, Joint Replacement, 

Orthopedic Surgery, Tonsillectomy


Additional Past Surgical History / Comment(s): states both rotator cuffs done


Date of Last Stent Placement:: 


Past Psychological History: No Psychological Hx Reported


Smoking Status: Current every day smoker


Past Alcohol Use History: Rare


Past Drug Use History: Marijuana





Medications and Allergies


                                Home Medications











 Medication  Instructions  Recorded  Confirmed  Type


 


Aspirin EC [Ecotrin] 81 mg PO DAILY 03/04/15 06/28/22 History


 


Metoprolol Tartrate [Lopressor] 50 mg PO BID 03/04/15 06/28/22 History


 


Simvastatin [Zocor] 40 mg PO MOWEFR 03/04/15 06/28/22 History


 


lisinopriL [Prinivil] 20 mg PO DAILY 03/04/15 06/28/22 History


 


Canagliflozin [Invokana] 300 mg PO DAILY 10/04/21 06/28/22 History


 


Famotidine [Pepcid] 20 mg PO BID 10/04/21 06/28/22 History


 


Insulin Glargine,Hum.rec.anlog 40 unit SQ HS 10/04/21 06/28/22 History





[Basaglar Kwikpen U-100]    


 


Insuln Asp Prt/Insulin Aspart 5 unit SQ AC-BID 10/04/21 06/28/22 History





[NovoLOG MIX 70-30 VIAL]    


 


Pioglitazone [Actos] 30 mg PO DAILY 10/04/21 06/28/22 History


 


Semaglutide [Ozempic] 1 mg SQ SA 22 History


 


metFORMIN HCL ER [Glucophage XR] 1,000 mg PO BID 22 History








                                    Allergies











Allergy/AdvReac Type Severity Reaction Status Date / Time


 


No Known Allergies Allergy   Verified 10/04/21 11:03














Physical Exam


Vitals: 


                                   Vital Signs











  Temp Pulse Pulse Resp BP BP Pulse Ox


 


 22 07:28  98.1 F   77  16   127/70  93 L


 


 22 01:20  98.3 F   84  16   132/71  95


 


 22 22:15  98.7 F   84  16   134/71  97


 


 22 21:53  98.4 F  87   18  137/70   93 L


 


 22 16:34  98.5 F  73   16  122/57   93 L


 


 22 13:10  97.3 F L  73   16  101/55   99








                                Intake and Output











 22





 22:59 06:59 14:59


 


Other:   


 


  Voiding Method  Urinal 


 


  # Voids  2 


 


  Weight 95.254 kg  














Results


CBC & Chem 7: 


                                 22 19:56





                                 22 19:56


Labs: 


                  Abnormal Lab Results - Last 24 Hours (Table)











  22 Range/Units





  19:56 19:56 07:03 


 


Lymphocytes #  0.9 L    (1.0-4.8)  k/uL


 


Sodium   135 L   (137-145)  mmol/L


 


Carbon Dioxide   21 L   (22-30)  mmol/L


 


BUN   22 H   (9-20)  mg/dL


 


Glucose   166 H   (74-99)  mg/dL


 


POC Glucose (mg/dL)    163 H  ()  mg/dL

## 2022-06-30 VITALS
RESPIRATION RATE: 18 BRPM | TEMPERATURE: 97.9 F | HEART RATE: 74 BPM | SYSTOLIC BLOOD PRESSURE: 135 MMHG | DIASTOLIC BLOOD PRESSURE: 70 MMHG

## 2022-06-30 LAB
GLUCOSE BLD-MCNC: 142 MG/DL (ref 70–110)
GLUCOSE BLD-MCNC: 150 MG/DL (ref 70–110)

## 2022-06-30 RX ADMIN — ASPIRIN SCH: 325 TABLET ORAL at 08:25

## 2022-06-30 RX ADMIN — NICOTINE SCH: 21 PATCH, EXTENDED RELEASE TRANSDERMAL at 09:29

## 2022-06-30 RX ADMIN — INSULIN ASPART SCH UNIT: 100 INJECTION, SOLUTION INTRAVENOUS; SUBCUTANEOUS at 12:51

## 2022-06-30 RX ADMIN — LIDOCAINE SCH PATCH: 50 PATCH TOPICAL at 08:33

## 2022-06-30 RX ADMIN — PIOGLITAZONE SCH MG: 30 TABLET ORAL at 08:32

## 2022-06-30 RX ADMIN — ASPIRIN 81 MG CHEWABLE TABLET SCH MG: 81 TABLET CHEWABLE at 08:32

## 2022-06-30 RX ADMIN — INSULIN ASPART SCH UNIT: 100 INJECTION, SOLUTION INTRAVENOUS; SUBCUTANEOUS at 08:31

## 2022-06-30 RX ADMIN — INSULIN ASPART SCH UNIT: 100 INJECTION, SUSPENSION SUBCUTANEOUS at 08:31

## 2022-06-30 RX ADMIN — HEPARIN SODIUM SCH UNIT: 5000 INJECTION INTRAVENOUS; SUBCUTANEOUS at 08:32

## 2022-06-30 RX ADMIN — FAMOTIDINE SCH MG: 20 TABLET, FILM COATED ORAL at 08:32

## 2022-06-30 RX ADMIN — METOPROLOL TARTRATE SCH MG: 50 TABLET, FILM COATED ORAL at 08:32

## 2022-06-30 RX ADMIN — HYDROMORPHONE HYDROCHLORIDE PRN MG: 1 INJECTION, SOLUTION INTRAMUSCULAR; INTRAVENOUS; SUBCUTANEOUS at 01:45

## 2022-06-30 NOTE — P.PN
Subjective


Progress Note Date: 06/30/22





HISTORY OF PRESENT ILLNESS


This is a 67-year-old male patient of Dr. Singh and Dr. KWADWO Haney with past medical 

history of diabetes mellitus type 2 (follows with Dr. INDERJIT Goss), hypertension, 

hyperlipidemia, gastroesophageal reflux disease, coronary artery disease with 

myocardial infarction in 2012 status post 3 stent placement in 2011 followed by 

2 stents in 2012, active tobacco use and dependence.  Patient states that he has

lost 50 pounds since he has been on Ozempic with drop in hemoglobin A1c from 10-

7.4.  Patient presented to the emergency center after trip and fall at work at 

[employer] where he was moving something heavy, falling backwards and 

hit his left hip.  Patient questions whether his left leg gave out on him or he 

lost his balance.  When he attempted to get up he was having significant pain, 

felt dizzy and subsequently had a syncopal event falling onto his right chest 

and hit his head with loss of consciousness.  Patient complained of left 

shoulder, right hip and left hip pain.  Patient's cardiologist is Dr. hilda suggs 

last saw him about few weeks ago.  No medication changes at that time.





Patient was found to be afebrile, heart rate in the 70s, blood pressure 101/55, 

pulse ox 99% on room air.  EKG sinus rhythm with no acute ST changes.


CBC unremarkable.  Sodium 135, potassium 4.0, CO2 21, BUN 22 creatinine 0.78.  

Blood sugar 166.


CT of the head and cervical spine revealed mild left posterior scalp contusion. 

Mild generalized cerebral atrophy.  No acute intracranial abnormality.  No acute

fracture or malalignment of the cervical spine.  Possible focal disc herniation 

of C3-C4 contributing to moderate spinal canal stenosis.  If symptomatic 

consider cervical spine MRI.  Moderate chronic ethmoid sinus disease.


Chest x-ray with right rib x-rays revealed right sixth and seventh rib acute 

fractures.


CAT scan of the left hip revealed mild osteoarthritis.  No evidence of hip 

fracture.


CAT scan of the pelvis revealed no acute bony abnormality.  No left hip 

fracture.  Sigmoid diverticulosis without diverticulitis.  Dilated urinary 

bladder suggestive of bladder outlet obstruction.


Patient was unable to ambulate with cane or walker, patient admitted to the 

[facility], orthopedics and MRI of the hip ordered, consult with cardiology 

added.


.


6/30: Patient has been seen by cardiology for syncope suspect vasovagal in 

nature.  Patient remains afebrile, heart rate 69, blood pressure 124/75, pulse 

ox 93% on room air.  Orthostatic vital signs negative.  Capillary blood glucose 

running between 137 and 206.  Troponin came back negative.  PT and OT tto work 

with the patient today as he states he is not able to cannulate on his own case 

management is following for discharge planning.  Medication reconciliation 

completed in anticipation of discharge today.  Patient has been cleared for 

discharge by cardiology.


Echocardiogram reveals EF of 50-55%, suboptimal study, mild tricuspid 

regurgitation.


MRI of the left hip revealed osteoarthritis.  Increased fluid signal in the 

gluteus medius muscle and soft tissues adjacent to the obturator externus muscle

consistent with muscle tear.  No acute fracture.  No evidence of avascular 

necrosis.


MRI of the pelvis revealed increased signal in the gluteus medius muscle in the 

posterior obturator externus muscle consistent with muscle tear or myositis.  No

evidence of bone fracture.





REVIEW OF SYSTEMS


Constitutional: No fever, no chills, no night sweats.  No weight change.  No 

weakness, fatigue or lethargy.  No daytime sleepiness.


EENT: No headache.  No blurred vision or double vision, no loss of vision.  No 

loss of Hearing, no ringing in the ears, no dizziness.  No nasal drainage or 

congestion.  No epistaxis.  No sore throat.


Lungs: No shortness of breath, cough, no sputum production.  No wheezing.


Cardiovascular: Reports chest pain, no lower extremity edema.  No palpitations. 

No paroxysmal nocturnal dyspnea.  No orthopnea.  No lightheadedness or 

dizziness.  Reports syncopal episode x1.


Abdominal: No abdominal pain.  No nausea, vomiting.  No diarrhea.  No 

constipation.  No bloody or tarry stools.  No loss of appetite.


Genitourinary: No dysuria, increased frequency, urgency.  No urinary retention.


Musculoskeletal: No myalgias.  No muscle weakness, reported gait dysfunction, no

frequent falls.  No back pain.  No neck pain.  Report right hip pain, left 

shoulder pain


Integumentary: No wounds, no lesions.  No rash or pruritus.  No unusual 

bruising.  No change in hair or nails.


Neurologic: No aphasia. No facial droop. No change in mentation. No head injury.

No headache. No paralysis. No paresthesia.


Psychiatric: No depression.  No anxiety.  No mood swings.


Endocrine: No abnormal blood sugars.  No weight change.  No excessive sweating 

or thirst.  No cold intolerance.  





PHYSICAL EXAMINATION


Gen: This is a 67-year-old  male.  He is resting in bed appears to be 

comfortable and in no acute distress.


HEENT: Head is atraumatic, normocephalic. Pupils equal, round. Sclerae is 

anicteric. 


NECK: Supple. No JVD. No lymphadenopathy. No thyromegaly. 


LUNGS: Clear to auscultation. No wheezes or rhonchi.  No intercostal 

retractions.


HEART: Regular rate and rhythm. No murmur. 


ABDOMEN: Soft. Bowel sounds are present. No masses.  No tenderness.


EXTREMITIES: No pedal edema.  No calf tenderness.


NEUROLOGICAL: Patient is awake, alert and oriented x3. Cranial nerves 2 through 

12 are grossly intact. 





ASSESSMENT AND PLAN


1.  Mechanical fall resulting in left hip pain and inability to ambulate 

concerning for hip fracture.  CAT scan is negative for fracture, MRI of the left

hip and pelvis negative for fracture.  Continue lidocaine patch, Dilaudid IV as 

needed, Norco and Tylenol ordered as needed.  Further plan per orthopedics.  PT 

and OT to work with patient today.





2.  Right-sided 6 and 7 rib fractures.  Incentive spirometry continued.





3.  Syncopal episode most likely vasovagal.  Consult with cardiology 

appreciated.  





4.  Shoulder pain, left.  Arm sling, orthopedics evaluation.





5.  Concussion with loss of consciousness.  Continue neuro checks per protocol. 

CAT scan of the brain negative for acute abnormality.





6.  Diabetes mellitus type 2.  Continue Levemir 40 units at bedtime, NovoLog mix

7035 units twice daily before meals, metformin 1000 mg twice daily, Actos 30 mg 

daily.  Patient may take his own home medication of Invokana 300 mg daily.





7.  Hypertension.  Continue lisinopril 20 mg daily, Lopressor 50 mg twice daily.





8.  Hyperlipidemia.  Continue simvastatin 40 mg Monday Wednesday Friday.





9.  Coronary artery disease status post MI, stent placement 5.  Continue 

aspirin 81 mg daily, Zocor 40 mg Monday Wednesday Friday, Lopressor 50 mg twice 

daily.





10.  Gastroesophageal reflux disease and GI prophylaxis.  Continue Pepcid 20 mg 

twice daily.





11.  Tobacco use and dependence.  Nicotine patch, smoking cessation.





12.  DVT prophylaxis.  Heparin subcu.








DISCHARGE PLAN


Home





Impression and plan of care have been directed as dictated by the signing 

physician.  Anastacia Beavers nurse practitioner acting as scribe for signing 

physician.





Objective





- Vital Signs


Vital signs: 


                                   Vital Signs











Temp  98.4 F   06/30/22 01:40


 


Pulse  69   06/30/22 01:40


 


Resp  16   06/30/22 01:52


 


BP  124/75   06/30/22 01:40


 


Pulse Ox  93 L  06/30/22 01:40


 


FiO2      








                                 Intake & Output











 06/29/22 06/30/22 06/30/22





 18:59 06:59 18:59


 


Intake Total 356  


 


Output Total 1575 1075 


 


Balance -1219 -1075 


 


Intake:   


 


  Oral 356  


 


Output:   


 


  Urine 1575 1075 


 


Other:   


 


  Voiding Method  Urinal 


 


  # Voids  1 














- Labs


CBC & Chem 7: 


                                 06/28/22 19:56





                                 06/28/22 19:56


Labs: 


                  Abnormal Lab Results - Last 24 Hours (Table)











  06/29/22 06/29/22 06/29/22 Range/Units





  11:35 18:10 21:36 


 


POC Glucose (mg/dL)  206 H  133 H  137 H  ()  mg/dL














  06/30/22 Range/Units





  07:36 


 


POC Glucose (mg/dL)  142 H  ()  mg/dL

## 2022-06-30 NOTE — MR
EXAMINATION TYPE: MR pelvis wo con

 

DATE OF EXAM: 6/29/2022

 

COMPARISON: None

 

HISTORY: intractible pain, s/p fall

 

Multiplanar multiecho imaging of the pelvis with no contrast.

 

There is right hip prosthesis with extensive metal artifact. Bladder distends smoothly. No free fluid
 in the pelvis. No pelvic mass. There are sigmoid diverticula.

 

The proximal left femur is intact. Acetabulum is intact. Left hip joint fluid appears fairly normal. 
No significant increased fluid. There is acetabular spurring. The ischium is intact.

 

There is on the T2 images and proton density images abnormal increased signal in the gluteus medius m
uscle which is not completely evaluated. This is consistent with muscle tear. There is fluid on the p
osterior aspect of the obturator externus muscle. Muscle tear is also possible in the obturator exter
nus. No evidence of a soft tissue mass. The sacroiliac joints appear intact. Iliac crests appear inta
ct. The psoas muscles are symmetric. Iliopsoas muscles appear intact.

 

IMPRESSION:

Increased signal in the gluteus medius muscle and the posterior obturator externus muscle consistent 
with muscle tear or myositis. No evidence of a bone fracture.

## 2022-06-30 NOTE — P.CONS
History of Present Illness





- Chief Complaint


Walking difficulty





- History of Present Illness





I had the opportunity to see patient for inpatient rehab consultation with 

regard to walking difficulty.  He is admitted to [facility] June 28 history of 

lifting something heavy with coworker and falling down landing on left but.  

Upon standing, patient experienced severe left cheek pain.  Underwent multiple 

diagnostic tests.  Head CT with mild generalized atrophy.  C-spine CT with C3 

herniation with moderate stenosis and otherwise extra spondylitic change.  Hip 

and pelvic x-ray negative for fracture.  Left shoulder x-ray consistent with 

chronic rotator cuff change.  Chest x-ray with fractures right ribs 6 and 7.  

Pelvic CT negative.  Hip CT consistent for arthritis only.  Carotid Doppler 

bathroom at his plaques.  Left hip MRI and pelvic MRI with arthritis of hip 

joint as well as tears gluteus medius and posterior obturator externus.  We seen

by cardiology in orthopedics PT and OT prescribed.





Previous functional history as elicited from patient: 67-year-old right-handed 

white male who is  lives in one floor home alone.  He is semiretired and 

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  Otherwise independent with own cooking, laundry, 

driving, standing shower gait without device.  He has a girlfriend who may be 

able to assist with discharge planning.  PCP Dr. Singh.  Smokes pack Per day and 

has occasional drink.





Review of Systems





Review of systems:


ENT: Denies sneezes or discharge.


Eyes: Denies discharge or photophobia.


Cardiac: Denies chest pain or palpitation.


Pulmonary: Denies cough or shortness of breath.


Gastrointestinal: Denies nausea, emesis, constipation, diarrhea.


Genitourinary: Denies discharge or frequency.


Musculoskeletal: Pain left hip cheek especially with standing.


Neurologic: Denies motor or sensory change.


Endocrine: Denies shakes or sweats.


Oncology: Denies cancers.


Dermatologic: Denies rash, itching, pruritus.


ALLERGY/immunology: Denies sneezes, rashes.








Past Medical History


Past Medical History: Coronary Artery Disease (CAD), Diabetes Mellitus, 

Hyperlipidemia, Hypertension, Myocardial Infarction (MI)


Additional Past Medical History / Comment(s): covid


Last Myocardial Infarction Date:: 2012


History of Any Multi-Drug Resistant Organisms: MRSA


Year Discovered:: 9/21


MDRO Source:: neck


Past Surgical History: Heart Catheterization With Stent, Joint Replacement, 

Orthopedic Surgery, Tonsillectomy


Additional Past Surgical History / Comment(s): states both rotator cuffs done


Date of Last Stent Placement:: 2012


Past Psychological History: No Psychological Hx Reported


Smoking Status: Current every day smoker


Past Alcohol Use History: Rare


Past Drug Use History: Marijuana





Medications and Allergies


                                Home Medications











 Medication  Instructions  Recorded  Confirmed  Type


 


Aspirin EC [Ecotrin Low Dose] 81 mg PO DAILY 03/04/15 06/28/22 History


 


Metoprolol Tartrate [Lopressor] 50 mg PO BID 03/04/15 06/28/22 History


 


Simvastatin [Zocor] 40 mg PO MOWEFR 03/04/15 06/28/22 History


 


lisinopriL [Prinivil] 20 mg PO DAILY 03/04/15 06/28/22 History


 


Canagliflozin [Invokana] 300 mg PO DAILY 10/04/21 06/28/22 History


 


Famotidine [Pepcid] 20 mg PO BID 10/04/21 06/28/22 History


 


Insulin Glargine,Hum.rec.anlog 40 unit SQ HS 10/04/21 06/28/22 History





[Basaglar Kwikpen U-100]    


 


Insuln Asp Prt/Insulin Aspart 5 unit SQ AC-BID 10/04/21 06/28/22 History





[NovoLOG MIX 70-30 VIAL]    


 


Pioglitazone [Actos] 30 mg PO DAILY 10/04/21 06/28/22 History


 


Semaglutide [Ozempic] 1 mg SQ SA 06/28/22 06/28/22 History


 


metFORMIN HCL ER [Glucophage XR] 1,000 mg PO BID 06/28/22 06/28/22 History


 


Baclofen 5 mg PO BID PRN #30 tab 06/30/22  Rx


 


HYDROcodone/APAP 7.5-325MG [Norco 1 - 2 each PO Q6HR PRN #30 tab 06/30/22  Rx





7.5-325]    


 


Nicotine 21Mg/24Hr Patch [Habitrol] 1 patch TRANSDERM DAILY #30 patch 06/30/22  

Rx


 


diazePAM [Valium] 2 mg PO Q8HR PRN 3 Days #9 tab 06/30/22  Rx


 


rOPINIRole HCL [Requip] 0.5 mg PO BID #28 tablet 06/30/22  Rx








                                    Allergies











Allergy/AdvReac Type Severity Reaction Status Date / Time


 


No Known Allergies Allergy   Verified 10/04/21 11:03














Physical Exam


Vitals: 


                                   Vital Signs











  Temp Pulse Resp BP Pulse Ox


 


 06/30/22 07:00  97.9 F  74  18  135/70  95


 


 06/30/22 01:52    16  


 


 06/30/22 01:40  98.4 F  69  15  124/75  93 L


 


 06/29/22 19:21  98.5 F  77  16  128/71  94 L


 


 06/29/22 13:48  98.8 F  63  16  121/68  97








                                Intake and Output











 06/29/22 06/30/22 06/30/22





 22:59 06:59 14:59


 


Intake Total 118  831


 


Output Total 350 1075 350


 


Balance -232 -1075 481


 


Intake:   


 


  Oral 118  831


 


Output:   


 


  Urine 350 1075 350


 


Other:   


 


  Voiding Method Urinal Urinal 


 


  # Voids 1 1 














Skin: Good color, texture, turgor.


General: Medium build and comfortable appearance.


Head: Normocephalic, atraumatic.


Eyes: Symmetric.  Pupils equal round.


Ears: Symmetric.  Hearing within normal limits.


Mouth: Clear.


Neck: Supple.  Carotid without bruit.


Cardiac: Regular rate and rhythm.


Lungs: Clear anteriorly and posteriorly.


Abdomen: Soft active nontender.


Extremities: Normal tone.


Neurological: Mental status: Alert, cooperative, pleasant.


Cranial nerves: Symmetric facial tone and trapezius.


Motor: Normal strength and isolation all 4 limbs.


Sensation: Intact throughout.


DTRs: Symmetric and equal throughout.


Mobility: Sits and stands without assistance or verbal cueing or loss of 

balance.  But does have some discomfort in left cheek.





Results


CBC & Chem 7: 


                                 06/28/22 19:56





                                 06/28/22 19:56


Labs: 


                  Abnormal Lab Results - Last 24 Hours (Table)











  06/29/22 06/29/22 06/29/22 Range/Units





  11:35 18:10 21:36 


 


POC Glucose (mg/dL)  206 H  133 H  137 H  ()  mg/dL














  06/30/22 Range/Units





  07:36 


 


POC Glucose (mg/dL)  142 H  ()  mg/dL














Assessment and Plan


(1) Fall


Current Visit: Yes   Status: Acute   Code(s): W19.XXXA - UNSPECIFIED FALL, 

INITIAL ENCOUNTER   SNOMED Code(s): 0125159


   





(2) Hip pain


Current Visit: Yes   Status: Acute   Priority: Medium   Code(s): M25.559 - PAIN 

IN UNSPECIFIED HIP   SNOMED Code(s): 99782043


   





(3) Shoulder strain


Current Visit: Yes   Status: Acute   Priority: Medium   Code(s): S46.919A - 

STRAIN UNSP MUSC/FASC/TEND AT SHLDR/UP ARM, UNSP ARM, INIT   SNOMED Code(s): 

778475208


   


Plan: 





Comments and plan: Patient had recent fall and suffered injuries of right ribs 6

 and 7 fracture as well as tears left gluteus medius, left posterior obturator 

externus muscles which are causing some significant discomfort practically with 

standing.  PT and OT prescribed.  Follow therapies with yourself.  Patient 

preference however he failed a walk to the bathroom on own and then go home and 

perhaps receive support services including therapy.  He feels that his 

girlfriend may be able to assist with discharge planning and pending the above.

## 2022-06-30 NOTE — P.PN
Subjective


Progress Note Date: 06/30/22





HISTORY OF PRESENT ILLNESS:  





This is a 67-year-old male with a past medical history significant for 

hypertension, hyperlipidemia, diabetes, nicotine dependence, and coronary artery

disease with previous stent placement, last in 2012. Patient follows in the 

office with Dr. Haney. We have been asked to see the patient in consultation for

syncope. Patient examined at the bedside. Patient states he was working with his

brother moving something heavy. He is unsure if he lost his balance or tripped, 

but nevertheless, he fell and landed on his hip. He tried to get up but the pain

was too much and the patient ended up passing out. He reports hitting his right 

shoulder at that time. He reports loss of consciousness.  Patient denies any 

chest pain or pressure this point.  He denies shortness of breath.  Denies any 

dizziness or lightheadedness.





* EKG reveals sinus mechanism with no signs of acute ischemia.


* Chest xray right sixth and seventh rib fractures


* Laboratory data: WBC 8.4.  Hemoglobin 13.7.  Platelet count 282.  Sodium 135. 

  Sodium 4.0.  B UN 22.  Creatinine 0.78.  Magnesium 1.9.  Troponin negative 1.


* Current home cardiac medications include metoprolol tartrate 50 mg twice a 

  day, lisinopril 20 mg daily, aspirin 81 mg daily, simvastatin 40 mg Monday Wednesday Friday 6/30/2022


Patient examined this point the bedside.  Patient denies chest pain or pressure.

 Denies shortness of breath.  Echocardiogram completed revealing technically 

suboptimal an incomplete study LV systolic function appeared normal.  Vital 

signs are stable.





PHYSICAL EXAM: 


VITAL SIGNS: Reviewed.


GENERAL: Well-developed in no acute distress. 


HEENT: Head is normocephalic. Pupils are equal, round. Sclerae anicteric. Mucous

membranes of the mouth are moist. Neck supple. No JVD or thyromegaly


LUNGS: Respirations even and unlabored. Lungs essentially clear to auscultation 

bilaterally.


HEART: Regular rate and rhythm.  S1 and S2 heard.


ABDOMEN: Soft. Nondistended. Nontender.


EXTREMITIES: Normal range of motion.  No clubbing or cyanosis.  Peripheral 

pulses intact.  No lower extremity edema


NEUROLOGIC: Awake and alert. Oriented x 3. 





ASSESSMENT: 


Syncope, suspect vasovagal in nature


Mechanical fall with subsequent hip pain


Right sixth and seventh rib fractures


Coronary artery disease with previous stenting


Hypertension


Hyperlipidemia


Diabetes


Nicotine dependence





PLAN: 


Continue current cardiac medications


Patient stable for discharge home today from a cardiac standpoint


Patient to follow up outpatient with Dr. Manzano








Nurse practitioner note has been reviewed by physician. Signing provider agrees 

with the documented findings, assessment, and plan of care. 











Objective





- Vital Signs


Vital signs: 


                                   Vital Signs











Temp  97.9 F   06/30/22 07:00


 


Pulse  74   06/30/22 07:00


 


Resp  18   06/30/22 07:00


 


BP  135/70   06/30/22 07:00


 


Pulse Ox  95   06/30/22 07:00


 


FiO2      








                                 Intake & Output











 06/29/22 06/30/22 06/30/22





 18:59 06:59 18:59


 


Intake Total 356  


 


Output Total 1575 1075 


 


Balance -1219 -1075 


 


Intake:   


 


  Oral 356  


 


Output:   


 


  Urine 1575 1075 


 


Other:   


 


  Voiding Method  Urinal 


 


  # Voids  1 














- Labs


CBC & Chem 7: 


                                 06/28/22 19:56





                                 06/28/22 19:56


Labs: 


                  Abnormal Lab Results - Last 24 Hours (Table)











  06/29/22 06/29/22 06/29/22 Range/Units





  11:35 18:10 21:36 


 


POC Glucose (mg/dL)  206 H  133 H  137 H  ()  mg/dL














  06/30/22 Range/Units





  07:36 


 


POC Glucose (mg/dL)  142 H  ()  mg/dL

## 2022-06-30 NOTE — MR
EXAMINATION TYPE: MR hip LT wo con

 

DATE OF EXAM: 6/29/2022

 

COMPARISON: None

 

HISTORY: intractible pain, s/p fall

 

Multiplanar multiecho imaging of the pelvis and left hip with no contrast.

 

There is metal artifact from right hip prosthesis. The proximal left femur is intact. No sign of avas
cular necrosis. No evidence of any significant hip joint effusion on the left side. No pelvic mass. T
here are multiple sigmoid diverticula noted no free fluid in the pelvis. Urinary bladder distends smo
othly. There is mild acetabular spurring. There is mild hip joint space narrowing. Acetabulum is inta
ct.

 

There is on the T2 images abnormal increased signal in the muscle bundle of the gluteus medius which 
extends superiorly to the ischium. There is some minimal edema adjacent to the obturator externus mus
marga also. This could relate to muscle tear and myositis.

 

IMPRESSION:

There is some osteoarthritis in the left hip joint. There is increased fluid signal in the gluteus me
dius muscle and the soft tissues adjacent to the obturator externus muscle consistent with muscle tea
r.

 

No fracture seen. No evidence of avascular necrosis.

## 2022-06-30 NOTE — P.PN
Subjective


Progress Note Date: 06/30/22


Principal diagnosis: 


Left hip pain





Patient is seen at bedside this morning.  We are following him for primary 

complaint of left buttock and hip pain where he also has rib fractures and left 

shoulder pain.  MRI of the left hip was ordered and performed yesterday.  He 

continues to have pain around the left buttock and hip.  States he has 

difficulty ambulating due to the pain.  He denies doing complaints including 

numbness or tingling or radicular symptoms.








Objective





- Vital Signs


Vital signs: 


                                   Vital Signs











Temp  97.9 F   06/30/22 07:00


 


Pulse  74   06/30/22 07:00


 


Resp  18   06/30/22 07:00


 


BP  135/70   06/30/22 07:00


 


Pulse Ox  95   06/30/22 07:00


 


FiO2      








                                 Intake & Output











 06/29/22 06/30/22 06/30/22





 18:59 06:59 18:59


 


Intake Total 356  831


 


Output Total 1575 1075 


 


Balance -1219 -1075 831


 


Intake:   


 


  Oral 356  831


 


Output:   


 


  Urine 1575 1075 


 


Other:   


 


  Voiding Method  Urinal 


 


  # Voids  1 














- Exam


Exam is unchanged.  Inspection of the low back and left lower extremity shows no

deformity, erythema, ecchymoses or wounds.  His left buttock pain with range of 

motion of the hip.  There is no groin pain.  His negative straight leg raise.  

Motor and sensation is intact about the left lower extremity.  Calf soft 

nontender.  2+ dorsalis pedis pulse and less than 2 second cap refill is present








- Constitutional


General appearance: Present: no acute distress





- Labs


CBC & Chem 7: 


                                 06/28/22 19:56





                                 06/28/22 19:56


Labs: 


                  Abnormal Lab Results - Last 24 Hours (Table)











  06/29/22 06/29/22 06/29/22 Range/Units





  11:35 18:10 21:36 


 


POC Glucose (mg/dL)  206 H  133 H  137 H  ()  mg/dL














  06/30/22 Range/Units





  07:36 


 


POC Glucose (mg/dL)  142 H  ()  mg/dL














Assessment and Plan


(1) Hip pain


Narrative/Plan: 


MRI of the left hip and pelvis showed tears of the gluteus medius and obturator 

muscles.  There are no fractures or other lesions seen.  No surgical i

ntervention is warranted or planned.  I recommended Continued pain management, 

physical therapy, rest and progress with activity as tolerated.  He may be 

discharged from the orthopedic standpoint.  He may follow up in office.


Current Visit: Yes   Status: Acute   Priority: Medium   Code(s): M25.559 - PAIN 

IN UNSPECIFIED HIP   SNOMED Code(s): 58802112


   





(2) Shoulder strain


Current Visit: Yes   Status: Acute   Priority: Medium   Code(s): S46.919A - 

STRAIN UNSP MUSC/FASC/TEND AT SHLDR/UP ARM, UNSP ARM, INIT   SNOMED Code(s): 

287098835


   


Time with Patient: Less than 30

## 2022-06-30 NOTE — P.DS
Providers


Date of admission: 


06/29/22 09:52





Expected date of discharge: 06/30/22


Attending physician: 


Ricky Yo





Consults: 





                                        





06/28/22 19:15


Consult Physician Stat 


   Consulting Provider: Jamari Lux Reason/Comments: medical management


   Do you want consulting provider notified?: Already Contacted





06/30/22 10:49


Consult Physician Routine 


   Consulting Provider: Juaquin Marie


   Consult Reason/Comments: possible IPR


   Do you want consulting provider notified?: Yes











Primary care physician: 


Umesh POON Kut








- Discharge Diagnosis(es)


(1) Hip pain


Patient was admitted through the ED  on 6/28/22 after suffering a fall at home 

injuring his let hip and shoulder. Studies were negative for fracture. Hospital 

course has remained without complication. On day of discharge he is afebrile, 

vital signs stable, labs within acceptable ranges, tolerating by mouth meds and 

diet, voiding without difficulty, positive flatus, denies abdominal pain or calf

pain, pain is controlled on oral pain medication and has no new complaints. 

Neurovascular status is intact, calf is soft and nontender, abdomen soft and 

nontender.  Review of systems is negative for numbness, tingling, fever, chills,

chest pain, shortness of breath, nausea, vomiting, dizziness, headaches, slurred

speech or other.


Status: Acute   Priority: Medium   





(2) Shoulder strain


Status: Acute   Priority: Medium   


Patient Condition at Discharge: Stable





Plan - Discharge Summary


New Discharge Prescriptions: 


New


   Baclofen 5 mg PO BID PRN #30 tab


     PRN Reason: muslce spasms


   Nicotine 21Mg/24Hr Patch [Habitrol] 1 patch TRANSDERM DAILY #30 patch


   rOPINIRole HCL [Requip] 0.5 mg PO BID #28 tablet


   HYDROcodone/APAP 7.5-325MG [Norco 7.5-325] 1 - 2 each PO Q6HR PRN #30 tab


     PRN Reason: Pain


   diazePAM [Valium] 2 mg PO Q8HR PRN 3 Days #9 tab


     PRN Reason: Spasms





Continue


   lisinopriL [Prinivil] 20 mg PO DAILY


   Aspirin EC [Ecotrin Low Dose] 81 mg PO DAILY


   Simvastatin [Zocor] 40 mg PO MOWEFR


   Metoprolol Tartrate [Lopressor] 50 mg PO BID


   Famotidine [Pepcid] 20 mg PO BID


   Pioglitazone [Actos] 30 mg PO DAILY


   Canagliflozin [Invokana] 300 mg PO DAILY


   Insuln Asp Prt/Insulin Aspart [NovoLOG MIX 70-30 VIAL] 5 unit SQ AC-BID


   metFORMIN HCL ER [Glucophage XR] 1,000 mg PO BID


   Insulin Glargine,Hum.rec.anlog [Basaglar Kwikpen U-100] 40 unit SQ HS


   Semaglutide [Ozempic] 1 mg SQ SA


Discharge Medication List





Aspirin EC [Ecotrin Low Dose] 81 mg PO DAILY 03/04/15 [History]


Metoprolol Tartrate [Lopressor] 50 mg PO BID 03/04/15 [History]


Simvastatin [Zocor] 40 mg PO MOWEFR 03/04/15 [History]


lisinopriL [Prinivil] 20 mg PO DAILY 03/04/15 [History]


Canagliflozin [Invokana] 300 mg PO DAILY 10/04/21 [History]


Famotidine [Pepcid] 20 mg PO BID 10/04/21 [History]


Insulin Glargine,Hum.rec.anlog [Basaglar Kwikpen U-100] 40 unit SQ HS 10/04/21 

[History]


Insuln Asp Prt/Insulin Aspart [NovoLOG MIX 70-30 VIAL] 5 unit SQ AC-BID 10/04/21

[History]


Pioglitazone [Actos] 30 mg PO DAILY 10/04/21 [History]


Semaglutide [Ozempic] 1 mg SQ SA 06/28/22 [History]


metFORMIN HCL ER [Glucophage XR] 1,000 mg PO BID 06/28/22 [History]


Baclofen 5 mg PO BID PRN #30 tab 06/30/22 [Rx]


HYDROcodone/APAP 7.5-325MG [Norco 7.5-325] 1 - 2 each PO Q6HR PRN #30 tab 

06/30/22 [Rx]


Nicotine 21Mg/24Hr Patch [Habitrol] 1 patch TRANSDERM DAILY #30 patch 06/30/22 

[Rx]


diazePAM [Valium] 2 mg PO Q8HR PRN 3 Days #9 tab 06/30/22 [Rx]


rOPINIRole HCL [Requip] 0.5 mg PO BID #28 tablet 06/30/22 [Rx]








Follow up Appointment(s)/Referral(s): 


[facility], [NON-STAFF] - 1-2 Days


Umesh Singh MD [Primary Care Provider] - 1 Week


Ricky Yo DO [Doctor of Osteopathic Medicine] - 10 Days


Patient Instructions/Handouts:  How to Stop Smoking (DC), Rib Fracture (ED), Hip

Pain (ED)


Activity/Diet/Wound Care/Special Instructions: 


WBAT


take meds as directed








Discharge/Stand Alone Forms:  Personal Care Manager


Discharge Disposition: HOME SELF-CARE

## 2024-12-05 ENCOUNTER — HOSPITAL ENCOUNTER (OUTPATIENT)
Dept: HOSPITAL 47 - LABWHC1 | Age: 69
Discharge: HOME | End: 2024-12-05
Attending: INTERNAL MEDICINE
Payer: MEDICARE

## 2024-12-05 DIAGNOSIS — E78.2: Primary | ICD-10-CM

## 2024-12-05 LAB
ALT SERPL-CCNC: 20 U/L (ref 10–49)
AST SERPL-CCNC: 20 U/L (ref 14–35)
CHOLEST SERPL-MCNC: 165 MG/DL (ref 0–200)
HDLC SERPL-MCNC: 41.4 MG/DL (ref 40–60)
LDLC SERPL CALC-MCNC: 103.2 MG/DL (ref 0–131)
TRIGL SERPL-MCNC: 102 MG/DL (ref 0–149)
VLDLC SERPL CALC-MCNC: 20.4 MG/DL (ref 5–40)

## 2024-12-05 PROCEDURE — 84450 TRANSFERASE (AST) (SGOT): CPT

## 2024-12-05 PROCEDURE — 36415 COLL VENOUS BLD VENIPUNCTURE: CPT

## 2024-12-05 PROCEDURE — 84460 ALANINE AMINO (ALT) (SGPT): CPT

## 2024-12-05 PROCEDURE — 80061 LIPID PANEL: CPT
